# Patient Record
Sex: MALE | Race: WHITE
[De-identification: names, ages, dates, MRNs, and addresses within clinical notes are randomized per-mention and may not be internally consistent; named-entity substitution may affect disease eponyms.]

---

## 2017-01-25 ENCOUNTER — HOSPITAL ENCOUNTER (OUTPATIENT)
Dept: HOSPITAL 19 - ZLAB.STJ | Age: 82
End: 2017-01-25
Attending: FAMILY MEDICINE

## 2017-01-25 DIAGNOSIS — Z01.89: Primary | ICD-10-CM

## 2017-01-25 LAB
ANION GAP SERPL CALC-SCNC: 9 MMOL/L (ref 7–16)
BUN SERPL-MCNC: 19 MG/DL (ref 9–20)
CALCIUM SERPL-MCNC: 9.7 MG/DL (ref 8.4–10.2)
CHLORIDE SERPL-SCNC: 105 MMOL/L (ref 98–107)
CO2 SERPL-SCNC: 28 MMOL/L (ref 22–30)
CREAT SERPL-SCNC: 1.36 MG/DL (ref 0.66–1.25)
GLUCOSE SERPL-MCNC: 83 MG/DL (ref 74–106)
POTASSIUM SERPL-SCNC: 3.9 MMOL/L (ref 3.4–5)
SODIUM SERPL-SCNC: 142 MMOL/L (ref 137–145)

## 2017-02-15 ENCOUNTER — HOSPITAL ENCOUNTER (INPATIENT)
Dept: HOSPITAL 19 - COL.ER | Age: 82
LOS: 3 days | Discharge: NURSING, REHAB OR LONG-TERM CARE | DRG: 280 | End: 2017-02-18
Attending: FAMILY MEDICINE | Admitting: FAMILY MEDICINE
Payer: MEDICARE

## 2017-02-15 VITALS — OXYGEN SATURATION: 97 %

## 2017-02-15 VITALS — OXYGEN SATURATION: 96 %

## 2017-02-15 VITALS — OXYGEN SATURATION: 95 %

## 2017-02-15 VITALS — OXYGEN SATURATION: 94 %

## 2017-02-15 VITALS — SYSTOLIC BLOOD PRESSURE: 166 MMHG | HEART RATE: 59 BPM | DIASTOLIC BLOOD PRESSURE: 87 MMHG

## 2017-02-15 VITALS — OXYGEN SATURATION: 98 %

## 2017-02-15 VITALS — OXYGEN SATURATION: 93 %

## 2017-02-15 VITALS — DIASTOLIC BLOOD PRESSURE: 90 MMHG | TEMPERATURE: 96.8 F | SYSTOLIC BLOOD PRESSURE: 154 MMHG | HEART RATE: 60 BPM

## 2017-02-15 VITALS — TEMPERATURE: 98 F | HEART RATE: 60 BPM | DIASTOLIC BLOOD PRESSURE: 93 MMHG | SYSTOLIC BLOOD PRESSURE: 177 MMHG

## 2017-02-15 VITALS — SYSTOLIC BLOOD PRESSURE: 177 MMHG | DIASTOLIC BLOOD PRESSURE: 93 MMHG | TEMPERATURE: 98.4 F | HEART RATE: 54 BPM

## 2017-02-15 VITALS — OXYGEN SATURATION: 99 %

## 2017-02-15 VITALS — WEIGHT: 193.79 LBS | BODY MASS INDEX: 29.37 KG/M2 | HEIGHT: 67.99 IN

## 2017-02-15 DIAGNOSIS — I21.4: ICD-10-CM

## 2017-02-15 DIAGNOSIS — Z95.0: ICD-10-CM

## 2017-02-15 DIAGNOSIS — Z79.01: ICD-10-CM

## 2017-02-15 DIAGNOSIS — N18.3: ICD-10-CM

## 2017-02-15 DIAGNOSIS — H40.9: ICD-10-CM

## 2017-02-15 DIAGNOSIS — I50.23: ICD-10-CM

## 2017-02-15 DIAGNOSIS — F03.90: ICD-10-CM

## 2017-02-15 DIAGNOSIS — I48.0: ICD-10-CM

## 2017-02-15 DIAGNOSIS — I13.0: Primary | ICD-10-CM

## 2017-02-15 DIAGNOSIS — I69.991: ICD-10-CM

## 2017-02-15 DIAGNOSIS — Z66: ICD-10-CM

## 2017-02-15 DIAGNOSIS — R13.10: ICD-10-CM

## 2017-02-15 DIAGNOSIS — E78.5: ICD-10-CM

## 2017-02-15 LAB
ADJUSTED CALCIUM: 9.5 MG/DL (ref 8.4–10.2)
ALBUMIN SERPL-MCNC: 4 GM/DL (ref 3.5–5)
ALP SERPL-CCNC: 94 U/L (ref 50–136)
ALT SERPL-CCNC: 35 U/L (ref 21–72)
ANION GAP SERPL CALC-SCNC: 12 MMOL/L (ref 7–16)
APTT PPP: 37.4 SECONDS (ref 26–37)
BASOPHILS # BLD: 0 10*3/UL (ref 0–0.2)
BASOPHILS NFR BLD AUTO: 0.7 % (ref 0–2)
BILIRUB SERPL-MCNC: 1 MG/DL (ref 0–1)
BUN SERPL-MCNC: 23 MG/DL (ref 9–20)
CALCIUM SERPL-MCNC: 9.5 MG/DL (ref 8.4–10.2)
CHLORIDE SERPL-SCNC: 103 MMOL/L (ref 98–107)
CO2 SERPL-SCNC: 28 MMOL/L (ref 22–30)
CREAT SERPL-SCNC: 1.24 MG/DL (ref 0.66–1.25)
EOSINOPHIL # BLD: 0.1 10*3/UL (ref 0–0.7)
EOSINOPHIL NFR BLD: 2.2 % (ref 0–4)
ERYTHROCYTE [DISTWIDTH] IN BLOOD BY AUTOMATED COUNT: 13.7 % (ref 11.5–14.5)
FLUBV AG SPEC QL IA: NEGATIVE
GLUCOSE SERPL-MCNC: 126 MG/DL (ref 74–106)
GRANULOCYTES # BLD AUTO: 70.9 % (ref 42.2–75.2)
HCT VFR BLD AUTO: 37.2 % (ref 42–52)
HGB BLD-MCNC: 12.2 G/DL (ref 13.5–18)
INR BLD: 2.3 (ref 0.8–3)
LYMPHOCYTES # BLD: 1.1 10*3/UL (ref 1.2–3.4)
LYMPHOCYTES NFR BLD: 20.1 % (ref 20–51)
MCH RBC QN AUTO: 31 PG (ref 27–31)
MCHC RBC AUTO-ENTMCNC: 33 G/DL (ref 33–37)
MCV RBC AUTO: 95 FL (ref 80–100)
MONOCYTES # BLD: 0.3 10*3/UL (ref 0.1–0.6)
MONOCYTES NFR BLD AUTO: 5.7 % (ref 1.7–9.3)
NEUTROPHILS # BLD: 3.9 10*3/UL (ref 1.4–6.5)
PLATELET # BLD AUTO: 169 K/MM3 (ref 130–400)
PMV BLD AUTO: 9.9 FL (ref 7.4–10.4)
POTASSIUM SERPL-SCNC: 3.5 MMOL/L (ref 3.4–5)
PROT SERPL-MCNC: 7.6 GM/DL (ref 6.4–8.2)
PROTHROMBIN TIME: 26.3 SECONDS (ref 9.7–12.8)
RBC # BLD AUTO: 3.9 M/MM3 (ref 4.2–5.6)
SODIUM SERPL-SCNC: 143 MMOL/L (ref 137–145)
TROPONIN I SERPL-MCNC: 0.08 NG/ML (ref 0–0.03)
WBC # BLD AUTO: 5.5 K/MM3 (ref 4.8–10.8)

## 2017-02-15 PROCEDURE — A9502 TC99M TETROFOSMIN: HCPCS

## 2017-02-16 VITALS — OXYGEN SATURATION: 96 %

## 2017-02-16 VITALS — OXYGEN SATURATION: 94 %

## 2017-02-16 VITALS — OXYGEN SATURATION: 97 %

## 2017-02-16 VITALS — OXYGEN SATURATION: 92 %

## 2017-02-16 VITALS — OXYGEN SATURATION: 98 %

## 2017-02-16 VITALS — OXYGEN SATURATION: 95 %

## 2017-02-16 VITALS
OXYGEN SATURATION: 97 % | TEMPERATURE: 97.6 F | SYSTOLIC BLOOD PRESSURE: 158 MMHG | HEART RATE: 60 BPM | DIASTOLIC BLOOD PRESSURE: 90 MMHG

## 2017-02-16 VITALS — OXYGEN SATURATION: 91 %

## 2017-02-16 VITALS — OXYGEN SATURATION: 93 %

## 2017-02-16 VITALS — DIASTOLIC BLOOD PRESSURE: 96 MMHG | SYSTOLIC BLOOD PRESSURE: 172 MMHG | HEART RATE: 62 BPM

## 2017-02-16 VITALS — OXYGEN SATURATION: 87 %

## 2017-02-16 VITALS
TEMPERATURE: 97.8 F | HEART RATE: 60 BPM | OXYGEN SATURATION: 98 % | SYSTOLIC BLOOD PRESSURE: 137 MMHG | DIASTOLIC BLOOD PRESSURE: 80 MMHG

## 2017-02-16 VITALS — OXYGEN SATURATION: 90 %

## 2017-02-16 VITALS
DIASTOLIC BLOOD PRESSURE: 83 MMHG | SYSTOLIC BLOOD PRESSURE: 140 MMHG | HEART RATE: 60 BPM | OXYGEN SATURATION: 95 % | TEMPERATURE: 97.8 F

## 2017-02-16 VITALS — OXYGEN SATURATION: 80 %

## 2017-02-16 VITALS — OXYGEN SATURATION: 99 %

## 2017-02-16 VITALS — OXYGEN SATURATION: 89 %

## 2017-02-16 VITALS — SYSTOLIC BLOOD PRESSURE: 148 MMHG | DIASTOLIC BLOOD PRESSURE: 73 MMHG | HEART RATE: 66 BPM

## 2017-02-16 VITALS — DIASTOLIC BLOOD PRESSURE: 84 MMHG | SYSTOLIC BLOOD PRESSURE: 174 MMHG | HEART RATE: 60 BPM

## 2017-02-16 VITALS
SYSTOLIC BLOOD PRESSURE: 153 MMHG | HEART RATE: 60 BPM | TEMPERATURE: 97.4 F | OXYGEN SATURATION: 95 % | DIASTOLIC BLOOD PRESSURE: 80 MMHG

## 2017-02-16 VITALS — TEMPERATURE: 97.5 F | SYSTOLIC BLOOD PRESSURE: 124 MMHG | DIASTOLIC BLOOD PRESSURE: 69 MMHG | HEART RATE: 60 BPM

## 2017-02-16 VITALS
TEMPERATURE: 97.8 F | OXYGEN SATURATION: 97 % | SYSTOLIC BLOOD PRESSURE: 177 MMHG | HEART RATE: 62 BPM | DIASTOLIC BLOOD PRESSURE: 87 MMHG

## 2017-02-16 VITALS — OXYGEN SATURATION: 85 %

## 2017-02-16 VITALS — OXYGEN SATURATION: 100 %

## 2017-02-16 VITALS — OXYGEN SATURATION: 88 %

## 2017-02-16 VITALS — SYSTOLIC BLOOD PRESSURE: 158 MMHG | HEART RATE: 60 BPM | OXYGEN SATURATION: 97 % | DIASTOLIC BLOOD PRESSURE: 90 MMHG

## 2017-02-16 VITALS — OXYGEN SATURATION: 86 %

## 2017-02-16 VITALS — HEART RATE: 60 BPM | DIASTOLIC BLOOD PRESSURE: 94 MMHG | SYSTOLIC BLOOD PRESSURE: 174 MMHG

## 2017-02-16 LAB
ANION GAP SERPL CALC-SCNC: 10 MMOL/L (ref 7–16)
BASOPHILS # BLD: 0 10*3/UL (ref 0–0.2)
BASOPHILS NFR BLD AUTO: 0.5 % (ref 0–2)
BUN SERPL-MCNC: 22 MG/DL (ref 9–20)
CALCIUM SERPL-MCNC: 9.3 MG/DL (ref 8.4–10.2)
CHLORIDE SERPL-SCNC: 104 MMOL/L (ref 98–107)
CHOLEST SPEC-SCNC: 88 MG/DL (ref 120–200)
CO2 SERPL-SCNC: 29 MMOL/L (ref 22–30)
CREAT SERPL-SCNC: 1.11 MG/DL (ref 0.66–1.25)
EOSINOPHIL # BLD: 0.1 10*3/UL (ref 0–0.7)
EOSINOPHIL NFR BLD: 2.2 % (ref 0–4)
ERYTHROCYTE [DISTWIDTH] IN BLOOD BY AUTOMATED COUNT: 13.8 % (ref 11.5–14.5)
GLUCOSE SERPL-MCNC: 94 MG/DL (ref 74–106)
GRANULOCYTES # BLD AUTO: 68 % (ref 42.2–75.2)
HCT VFR BLD AUTO: 33 % (ref 42–52)
HDLC SERPL-MCNC: 34 MG/DL
HGB BLD-MCNC: 10.8 G/DL (ref 13.5–18)
LDLC SERPL-MCNC: 40 MG/DL
LYMPHOCYTES # BLD: 1.3 10*3/UL (ref 1.2–3.4)
LYMPHOCYTES NFR BLD: 21.2 % (ref 20–51)
MCH RBC QN AUTO: 31 PG (ref 27–31)
MCHC RBC AUTO-ENTMCNC: 33 G/DL (ref 33–37)
MCV RBC AUTO: 95 FL (ref 80–100)
MONOCYTES # BLD: 0.5 10*3/UL (ref 0.1–0.6)
MONOCYTES NFR BLD AUTO: 7.9 % (ref 1.7–9.3)
NEUTROPHILS # BLD: 4.3 10*3/UL (ref 1.4–6.5)
PLATELET # BLD AUTO: 175 K/MM3 (ref 130–400)
PMV BLD AUTO: 10.1 FL (ref 7.4–10.4)
POTASSIUM SERPL-SCNC: 3.6 MMOL/L (ref 3.4–5)
RBC # BLD AUTO: 3.47 M/MM3 (ref 4.2–5.6)
SODIUM SERPL-SCNC: 142 MMOL/L (ref 137–145)
TRIGL SERPL-MCNC: 72 MG/DL
TROPONIN I SERPL-MCNC: 7.54 NG/ML (ref 0–0.03)
WBC # BLD AUTO: 6.3 K/MM3 (ref 4.8–10.8)

## 2017-02-17 VITALS — OXYGEN SATURATION: 96 %

## 2017-02-17 VITALS — OXYGEN SATURATION: 94 %

## 2017-02-17 VITALS — OXYGEN SATURATION: 97 %

## 2017-02-17 VITALS — OXYGEN SATURATION: 95 %

## 2017-02-17 VITALS — OXYGEN SATURATION: 99 %

## 2017-02-17 VITALS — OXYGEN SATURATION: 93 %

## 2017-02-17 VITALS — OXYGEN SATURATION: 98 %

## 2017-02-17 VITALS
TEMPERATURE: 98 F | DIASTOLIC BLOOD PRESSURE: 70 MMHG | OXYGEN SATURATION: 97 % | HEART RATE: 60 BPM | SYSTOLIC BLOOD PRESSURE: 123 MMHG

## 2017-02-17 VITALS — HEART RATE: 60 BPM | DIASTOLIC BLOOD PRESSURE: 75 MMHG | TEMPERATURE: 97.8 F | SYSTOLIC BLOOD PRESSURE: 135 MMHG

## 2017-02-17 VITALS — SYSTOLIC BLOOD PRESSURE: 118 MMHG | HEART RATE: 60 BPM | TEMPERATURE: 97.5 F | DIASTOLIC BLOOD PRESSURE: 59 MMHG

## 2017-02-17 VITALS — SYSTOLIC BLOOD PRESSURE: 103 MMHG | HEART RATE: 70 BPM | DIASTOLIC BLOOD PRESSURE: 54 MMHG | TEMPERATURE: 97.1 F

## 2017-02-17 VITALS
SYSTOLIC BLOOD PRESSURE: 133 MMHG | TEMPERATURE: 97.5 F | DIASTOLIC BLOOD PRESSURE: 70 MMHG | OXYGEN SATURATION: 97 % | HEART RATE: 67 BPM

## 2017-02-17 VITALS
HEART RATE: 61 BPM | SYSTOLIC BLOOD PRESSURE: 115 MMHG | DIASTOLIC BLOOD PRESSURE: 72 MMHG | TEMPERATURE: 98 F | OXYGEN SATURATION: 94 %

## 2017-02-17 VITALS — OXYGEN SATURATION: 92 %

## 2017-02-17 VITALS — HEART RATE: 59 BPM | SYSTOLIC BLOOD PRESSURE: 125 MMHG | TEMPERATURE: 97.8 F | DIASTOLIC BLOOD PRESSURE: 71 MMHG

## 2017-02-17 VITALS — OXYGEN SATURATION: 89 %

## 2017-02-17 LAB
ANION GAP SERPL CALC-SCNC: 11 MMOL/L (ref 7–16)
BUN SERPL-MCNC: 23 MG/DL (ref 9–20)
CALCIUM SERPL-MCNC: 9.2 MG/DL (ref 8.4–10.2)
CHLORIDE SERPL-SCNC: 102 MMOL/L (ref 98–107)
CO2 SERPL-SCNC: 27 MMOL/L (ref 22–30)
CREAT SERPL-SCNC: 1.09 MG/DL (ref 0.66–1.25)
GLUCOSE SERPL-MCNC: 103 MG/DL (ref 74–106)
INR BLD: 2.8 (ref 0.8–3)
POTASSIUM SERPL-SCNC: 3.3 MMOL/L (ref 3.4–5)
PROTHROMBIN TIME: 31.8 SECONDS (ref 9.7–12.8)
SODIUM SERPL-SCNC: 141 MMOL/L (ref 137–145)
TROPONIN I SERPL-MCNC: 2.6 NG/ML (ref 0–0.03)

## 2017-02-18 VITALS — HEART RATE: 59 BPM | TEMPERATURE: 97.9 F | DIASTOLIC BLOOD PRESSURE: 29 MMHG | SYSTOLIC BLOOD PRESSURE: 88 MMHG

## 2017-02-18 VITALS — DIASTOLIC BLOOD PRESSURE: 69 MMHG | HEART RATE: 68 BPM | TEMPERATURE: 99.3 F | SYSTOLIC BLOOD PRESSURE: 141 MMHG

## 2017-02-18 VITALS — SYSTOLIC BLOOD PRESSURE: 113 MMHG | HEART RATE: 62 BPM | DIASTOLIC BLOOD PRESSURE: 62 MMHG

## 2017-02-18 VITALS — DIASTOLIC BLOOD PRESSURE: 60 MMHG | TEMPERATURE: 97.4 F | SYSTOLIC BLOOD PRESSURE: 129 MMHG | HEART RATE: 61 BPM

## 2017-02-18 VITALS — HEART RATE: 61 BPM | TEMPERATURE: 97.4 F | SYSTOLIC BLOOD PRESSURE: 129 MMHG | DIASTOLIC BLOOD PRESSURE: 60 MMHG

## 2017-02-18 LAB
ANION GAP SERPL CALC-SCNC: 9 MMOL/L (ref 7–16)
BASOPHILS # BLD: 0 10*3/UL (ref 0–0.2)
BASOPHILS NFR BLD AUTO: 0.6 % (ref 0–2)
BUN SERPL-MCNC: 23 MG/DL (ref 9–20)
CALCIUM SERPL-MCNC: 9.5 MG/DL (ref 8.4–10.2)
CHLORIDE SERPL-SCNC: 102 MMOL/L (ref 98–107)
CO2 SERPL-SCNC: 30 MMOL/L (ref 22–30)
CREAT SERPL-SCNC: 1.15 MG/DL (ref 0.66–1.25)
EOSINOPHIL # BLD: 0.2 10*3/UL (ref 0–0.7)
EOSINOPHIL NFR BLD: 3 % (ref 0–4)
ERYTHROCYTE [DISTWIDTH] IN BLOOD BY AUTOMATED COUNT: 13.8 % (ref 11.5–14.5)
GLUCOSE SERPL-MCNC: 90 MG/DL (ref 74–106)
GRANULOCYTES # BLD AUTO: 61.7 % (ref 42.2–75.2)
HCT VFR BLD AUTO: 32.8 % (ref 42–52)
HGB BLD-MCNC: 10.7 G/DL (ref 13.5–18)
INR BLD: 2.2 (ref 0.8–3)
LYMPHOCYTES # BLD: 1.3 10*3/UL (ref 1.2–3.4)
LYMPHOCYTES NFR BLD: 25.5 % (ref 20–51)
MCH RBC QN AUTO: 31 PG (ref 27–31)
MCHC RBC AUTO-ENTMCNC: 33 G/DL (ref 33–37)
MCV RBC AUTO: 96 FL (ref 80–100)
MONOCYTES # BLD: 0.5 10*3/UL (ref 0.1–0.6)
MONOCYTES NFR BLD AUTO: 9 % (ref 1.7–9.3)
NEUTROPHILS # BLD: 3.2 10*3/UL (ref 1.4–6.5)
PLATELET # BLD AUTO: 156 K/MM3 (ref 130–400)
PMV BLD AUTO: 10.4 FL (ref 7.4–10.4)
POTASSIUM SERPL-SCNC: 3.5 MMOL/L (ref 3.4–5)
PROTHROMBIN TIME: 24.6 SECONDS (ref 9.7–12.8)
RBC # BLD AUTO: 3.41 M/MM3 (ref 4.2–5.6)
SODIUM SERPL-SCNC: 141 MMOL/L (ref 137–145)
WBC # BLD AUTO: 5.3 K/MM3 (ref 4.8–10.8)

## 2019-02-17 ENCOUNTER — HOSPITAL ENCOUNTER (INPATIENT)
Dept: HOSPITAL 19 - COL.ER | Age: 84
LOS: 11 days | Discharge: HOSPICE | DRG: 870 | End: 2019-02-28
Attending: HOSPITALIST | Admitting: HOSPITALIST
Payer: MEDICARE

## 2019-02-17 VITALS — OXYGEN SATURATION: 100 % | DIASTOLIC BLOOD PRESSURE: 60 MMHG | SYSTOLIC BLOOD PRESSURE: 100 MMHG | HEART RATE: 60 BPM

## 2019-02-17 VITALS — OXYGEN SATURATION: 99 %

## 2019-02-17 VITALS — OXYGEN SATURATION: 100 %

## 2019-02-17 VITALS — OXYGEN SATURATION: 98 %

## 2019-02-17 VITALS — TEMPERATURE: 99.5 F | SYSTOLIC BLOOD PRESSURE: 101 MMHG | HEART RATE: 65 BPM | DIASTOLIC BLOOD PRESSURE: 60 MMHG

## 2019-02-17 VITALS — DIASTOLIC BLOOD PRESSURE: 57 MMHG | OXYGEN SATURATION: 96 % | HEART RATE: 60 BPM | SYSTOLIC BLOOD PRESSURE: 108 MMHG

## 2019-02-17 VITALS — BODY MASS INDEX: 30.84 KG/M2 | HEIGHT: 67.99 IN | WEIGHT: 203.49 LBS

## 2019-02-17 VITALS — OXYGEN SATURATION: 95 %

## 2019-02-17 VITALS — OXYGEN SATURATION: 97 %

## 2019-02-17 DIAGNOSIS — E87.2: ICD-10-CM

## 2019-02-17 DIAGNOSIS — E05.90: ICD-10-CM

## 2019-02-17 DIAGNOSIS — I21.A1: ICD-10-CM

## 2019-02-17 DIAGNOSIS — G72.81: ICD-10-CM

## 2019-02-17 DIAGNOSIS — J15.0: ICD-10-CM

## 2019-02-17 DIAGNOSIS — I50.32: ICD-10-CM

## 2019-02-17 DIAGNOSIS — J90: ICD-10-CM

## 2019-02-17 DIAGNOSIS — Z66: ICD-10-CM

## 2019-02-17 DIAGNOSIS — R65.20: ICD-10-CM

## 2019-02-17 DIAGNOSIS — C34.31: ICD-10-CM

## 2019-02-17 DIAGNOSIS — E78.5: ICD-10-CM

## 2019-02-17 DIAGNOSIS — Z51.5: ICD-10-CM

## 2019-02-17 DIAGNOSIS — N17.9: ICD-10-CM

## 2019-02-17 DIAGNOSIS — Z79.01: ICD-10-CM

## 2019-02-17 DIAGNOSIS — E86.0: ICD-10-CM

## 2019-02-17 DIAGNOSIS — Z95.0: ICD-10-CM

## 2019-02-17 DIAGNOSIS — I13.0: ICD-10-CM

## 2019-02-17 DIAGNOSIS — A41.1: Primary | ICD-10-CM

## 2019-02-17 DIAGNOSIS — J96.01: ICD-10-CM

## 2019-02-17 DIAGNOSIS — I48.2: ICD-10-CM

## 2019-02-17 DIAGNOSIS — E87.0: ICD-10-CM

## 2019-02-17 DIAGNOSIS — N18.9: ICD-10-CM

## 2019-02-17 LAB
ALBUMIN SERPL-MCNC: 4 GM/DL (ref 3.5–5)
ALP SERPL-CCNC: 123 U/L (ref 50–136)
ALT SERPL-CCNC: 24 U/L (ref 21–72)
ANION GAP SERPL CALC-SCNC: 17 MMOL/L (ref 7–16)
APTT PPP: 33.3 SECONDS (ref 26–37)
AST SERPL-CCNC: 32 U/L (ref 15–37)
BASE EXCESS BLDA CALC-SCNC: -3.5 MMOL/L (ref -2–2)
BASE EXCESS BLDA CALC-SCNC: -5.4 MMOL/L (ref -2–2)
BASE EXCESS BLDA CALC-SCNC: -5.6 MMOL/L (ref -2–2)
BILIRUB SERPL-MCNC: 1.4 MG/DL (ref 0–1)
BUN SERPL-MCNC: 29 MG/DL (ref 9–20)
CALCIUM SERPL-MCNC: 9.6 MG/DL (ref 8.4–10.2)
CHLORIDE SERPL-SCNC: 106 MMOL/L (ref 98–107)
CO2 BLDA-SCNC: 18 MMOL/L
CO2 BLDA-SCNC: 19.5 MMOL/L
CO2 BLDA-SCNC: 21.2 MMOL/L
CO2 SERPL-SCNC: 17 MMOL/L (ref 22–30)
CREAT SERPL-SCNC: 1.91 MG/DL (ref 0.66–1.25)
EOSINOPHIL NFR BLD: 1 % (ref 0–4)
ERYTHROCYTE [DISTWIDTH] IN BLOOD BY AUTOMATED COUNT: 13.8 % (ref 11.5–14.5)
GLUCOSE SERPL-MCNC: 163 MG/DL (ref 74–106)
HCO3 BLDA-SCNC: 17.2 MEQ/L (ref 22–26)
HCO3 BLDA-SCNC: 18.6 MEQ/L (ref 22–26)
HCO3 BLDA-SCNC: 20.3 MEQ/L (ref 22–26)
HCT VFR BLD AUTO: 32.5 % (ref 42–52)
HGB BLD-MCNC: 10.6 G/DL (ref 13.5–18)
INHALED O2 CONCENTRATION: 100 %
INHALED O2 CONCENTRATION: 50 %
INHALED O2 CONCENTRATION: 99 %
INR BLD: 2.9 (ref 0.8–3)
LYMPHOCYTES NFR BLD MANUAL: 5 % (ref 20–51)
MCH RBC QN AUTO: 31 PG (ref 27–31)
MCHC RBC AUTO-ENTMCNC: 33 G/DL (ref 33–37)
MCV RBC AUTO: 95 FL (ref 80–100)
NEUTS BAND NFR BLD: 14 % (ref 0–10)
NEUTS SEG NFR BLD MANUAL: 80 % (ref 42–75.2)
PCO2 BLDA: 25.6 MMHG (ref 35–45)
PCO2 BLDA: 31.6 MMHG (ref 35–45)
PCO2 BLDA: 32.1 MMHG (ref 35–45)
PLATELET # BLD AUTO: 182 K/MM3 (ref 130–400)
PMV BLD AUTO: 9.9 FL (ref 7.4–10.4)
PO2 BLDA: 238.9 MMHG (ref 80–100)
PO2 BLDA: 53.2 MMHG (ref 80–100)
PO2 BLDA: 71.5 MMHG (ref 80–100)
POTASSIUM SERPL-SCNC: 4.5 MMOL/L (ref 3.4–5)
PROT SERPL-MCNC: 7.7 GM/DL (ref 6.4–8.2)
PROTHROMBIN TIME: 33.5 SECONDS (ref 9.7–12.8)
RBC # BLD AUTO: 3.41 M/MM3 (ref 4.2–5.6)
SAO2 % BLDA: 86.9 % (ref 92–100)
SAO2 % BLDA: 92.5 % (ref 92–100)
SAO2 % BLDA: 98.5 % (ref 92–100)
SODIUM SERPL-SCNC: 141 MMOL/L (ref 137–145)
TROPONIN I SERPL-MCNC: 0.04 NG/ML (ref 0–0.04)

## 2019-02-17 PROCEDURE — C1751 CATH, INF, PER/CENT/MIDLINE: HCPCS

## 2019-02-17 PROCEDURE — 5A1955Z RESPIRATORY VENTILATION, GREATER THAN 96 CONSECUTIVE HOURS: ICD-10-PCS | Performed by: INTERNAL MEDICINE

## 2019-02-17 PROCEDURE — A4216 STERILE WATER/SALINE, 10 ML: HCPCS

## 2019-02-17 NOTE — NUR
Anesthesia provider intubated pt at 2011 in ICU01 with an 8.0 tube. Sedation
medication provided per provider. Provider remained at bedside for
approximately 20 minutes following intubation. RT staff X2 with nursing staff
X3 in room at this time. Pt tolerated procedure.

## 2019-02-17 NOTE — NUR
PT ARRIVED FROM ED. PATIENT INTUBATED BY CRNA WITH A 8.0 ET TUBE. COLOR CHANGE
ON ETCO2, BILATERAL CHEST RISE AND BS. CXR TAKEN TO CONFIRM ET TUBE PLACEMENT,
PT PLACED ON VENT PER DOCTORS ORDERS. ABG DRAWN 30 MINUTES AFTER BEING PLACED
ON VENT, RESULTS CALLED TO [facility]. FIO2 TITRATED AS PER [facility] ORDERS, NO
FURTHER ORDERS FROM [facility] AT THIS TIME.

## 2019-02-17 NOTE — NUR
Report received from Aarti FRANCISCO from ED. ED nurse notified unit is awaiting
another staff member in order to assume care. Will call when staff member
arrives.

## 2019-02-18 VITALS — TEMPERATURE: 97.5 F | HEART RATE: 67 BPM | SYSTOLIC BLOOD PRESSURE: 104 MMHG | DIASTOLIC BLOOD PRESSURE: 64 MMHG

## 2019-02-18 VITALS — OXYGEN SATURATION: 96 %

## 2019-02-18 VITALS — OXYGEN SATURATION: 95 %

## 2019-02-18 VITALS — TEMPERATURE: 97.9 F | HEART RATE: 59 BPM | SYSTOLIC BLOOD PRESSURE: 102 MMHG | DIASTOLIC BLOOD PRESSURE: 58 MMHG

## 2019-02-18 VITALS — OXYGEN SATURATION: 98 %

## 2019-02-18 VITALS — OXYGEN SATURATION: 99 %

## 2019-02-18 VITALS — OXYGEN SATURATION: 97 %

## 2019-02-18 VITALS — OXYGEN SATURATION: 93 %

## 2019-02-18 VITALS — OXYGEN SATURATION: 94 %

## 2019-02-18 VITALS — OXYGEN SATURATION: 92 %

## 2019-02-18 VITALS — TEMPERATURE: 97.8 F | HEART RATE: 61 BPM | DIASTOLIC BLOOD PRESSURE: 62 MMHG | SYSTOLIC BLOOD PRESSURE: 111 MMHG

## 2019-02-18 VITALS
DIASTOLIC BLOOD PRESSURE: 60 MMHG | OXYGEN SATURATION: 97 % | HEART RATE: 65 BPM | TEMPERATURE: 99.5 F | SYSTOLIC BLOOD PRESSURE: 101 MMHG

## 2019-02-18 VITALS — OXYGEN SATURATION: 91 %

## 2019-02-18 VITALS
DIASTOLIC BLOOD PRESSURE: 67 MMHG | SYSTOLIC BLOOD PRESSURE: 120 MMHG | OXYGEN SATURATION: 97 % | TEMPERATURE: 97.7 F | HEART RATE: 61 BPM

## 2019-02-18 VITALS — HEART RATE: 60 BPM | SYSTOLIC BLOOD PRESSURE: 96 MMHG | TEMPERATURE: 97.7 F | DIASTOLIC BLOOD PRESSURE: 45 MMHG

## 2019-02-18 VITALS — TEMPERATURE: 98 F | HEART RATE: 64 BPM | DIASTOLIC BLOOD PRESSURE: 56 MMHG | SYSTOLIC BLOOD PRESSURE: 100 MMHG

## 2019-02-18 VITALS — OXYGEN SATURATION: 100 %

## 2019-02-18 VITALS — OXYGEN SATURATION: 90 %

## 2019-02-18 VITALS — OXYGEN SATURATION: 88 %

## 2019-02-18 VITALS — OXYGEN SATURATION: 85 %

## 2019-02-18 LAB
ALBUMIN SERPL-MCNC: 3 GM/DL (ref 3.5–5)
ALP SERPL-CCNC: 77 U/L (ref 50–136)
ALT SERPL-CCNC: 29 U/L (ref 21–72)
ANION GAP SERPL CALC-SCNC: 8 MMOL/L (ref 7–16)
AST SERPL-CCNC: 47 U/L (ref 15–37)
BASE EXCESS BLDA CALC-SCNC: -2.6 MMOL/L (ref -2–2)
BASOPHILS # BLD: 0 10*3/UL (ref 0–0.2)
BASOPHILS NFR BLD AUTO: 0.4 % (ref 0–2)
BILIRUB SERPL-MCNC: 0.7 MG/DL (ref 0–1)
BUN SERPL-MCNC: 32 MG/DL (ref 9–20)
CALCIUM SERPL-MCNC: 8.3 MG/DL (ref 8.4–10.2)
CHLORIDE SERPL-SCNC: 111 MMOL/L (ref 98–107)
CO2 BLDA-SCNC: 22.9 MMOL/L
CO2 SERPL-SCNC: 23 MMOL/L (ref 22–30)
CREAT SERPL-SCNC: 1.9 MG/DL (ref 0.66–1.25)
EOSINOPHIL # BLD: 0 10*3/UL (ref 0–0.7)
EOSINOPHIL NFR BLD: 0.1 % (ref 0–4)
ERYTHROCYTE [DISTWIDTH] IN BLOOD BY AUTOMATED COUNT: 14.3 % (ref 11.5–14.5)
GLUCOSE SERPL-MCNC: 111 MG/DL (ref 74–106)
GRANULOCYTES # BLD AUTO: 87.4 % (ref 42.2–75.2)
HCO3 BLDA-SCNC: 21.8 MEQ/L (ref 22–26)
HCT VFR BLD AUTO: 27.3 % (ref 42–52)
HGB BLD-MCNC: 8.9 G/DL (ref 13.5–18)
INHALED O2 CONCENTRATION: 40 %
INR BLD: 3.9 (ref 0.8–3)
LYMPHOCYTES # BLD: 0.6 10*3/UL (ref 1.2–3.4)
LYMPHOCYTES NFR BLD: 6.5 % (ref 20–51)
MAGNESIUM SERPL-MCNC: 2 MG/DL (ref 1.6–2.3)
MCH RBC QN AUTO: 32 PG (ref 27–31)
MCHC RBC AUTO-ENTMCNC: 33 G/DL (ref 33–37)
MCV RBC AUTO: 98 FL (ref 80–100)
MONOCYTES # BLD: 0.5 10*3/UL (ref 0.1–0.6)
MONOCYTES NFR BLD AUTO: 5.3 % (ref 1.7–9.3)
NEUTROPHILS # BLD: 8.2 10*3/UL (ref 1.4–6.5)
PCO2 BLDA: 36.3 MMHG (ref 35–45)
PHOSPHATE SERPL-MCNC: 4.5 MG/DL (ref 2.5–4.5)
PLATELET # BLD AUTO: 126 K/MM3 (ref 130–400)
PMV BLD AUTO: 9.8 FL (ref 7.4–10.4)
PO2 BLDA: 97.3 MMHG (ref 80–100)
POTASSIUM SERPL-SCNC: 4.5 MMOL/L (ref 3.4–5)
PROT SERPL-MCNC: 6.1 GM/DL (ref 6.4–8.2)
PROTHROMBIN TIME: 44.7 SECONDS (ref 9.7–12.8)
RBC # BLD AUTO: 2.8 M/MM3 (ref 4.2–5.6)
SAO2 % BLDA: 96.3 % (ref 92–100)
SODIUM SERPL-SCNC: 141 MMOL/L (ref 137–145)
TROPONIN I SERPL-MCNC: 1.15 NG/ML (ref 0–0.04)

## 2019-02-18 NOTE — NUR
Bedside report recieved from Dora FRANCISCO. Medication gtt's verified; ET and OG
tubes verified as well. Patient turned, repositioned, and skin assessed. Cath
care done at this time. Call light in reach. Family in room after report
complete.

## 2019-02-18 NOTE — NUR
Patient drowsy, minimal response when name called or with tactile stim.
Sedation tritrated down again at this time.

## 2019-02-18 NOTE — NUR
Received a phone call from Dr. Huntley regarding PICC tip location. PICC
flushed with 30ml normal saline with good blood return noted. Chest x ray
repeated. PICC tip location in lower SVC.

## 2019-02-18 NOTE — NUR
Patient restless, pulling against wrist restraints. Sedation increased at this
time as patiet doesn't follow commands when told to relax. Will attempt to
wean sedation down after PICC placement.

## 2019-02-18 NOTE — NUR
Bedside report given to Lelo FRANCISCO. Family and RT at bedside for report,
questions answered. Patient repositioned to right side and skin inspected with
on coming RN

## 2019-02-18 NOTE — NUR
Family at bedside.  Assessment completed; patient opens eyes spontaneously and
is observed moving all extremities. Not able to follow commands at this time.
Granddaughter states he is very hard of hearing.  Will continue to monitor.

## 2019-02-18 NOTE — NUR
SW met with patient, who is intubated, and his daughter.  Patient lives at [facility]
in Independent living.  He was driving up to this weekend and doing well
independently.  Patients PCP is Dr Valera and he obtains his medications from
[facility].  Patients DPOA is in EMR.  Patients Daughter reports if he is able
they would like him to go back to [facility] for IL or SN if needed but he also
recently got diagnosed with cancer and they could possibly be looking into
hospice.  SW discussed their options if they would like to persue hospice but
they would like to continue to see how he progresses.  SW will continue to
follow to assist in dc planning.

## 2019-02-18 NOTE — NUR
Starting to arouse better with verbal and tactile stim. Will squeeze hands of
this RN, but will not open eyes. Sedation titrted down again. Will monitor.
Daughter at bedside, and explained titration, voices understanding.

## 2019-02-18 NOTE — NUR
Sedation vacation started at this time. Propofol and Fentanyl both decreased,
will monitor for need to adjust.

## 2019-02-19 VITALS — OXYGEN SATURATION: 95 %

## 2019-02-19 VITALS — OXYGEN SATURATION: 98 %

## 2019-02-19 VITALS — OXYGEN SATURATION: 97 %

## 2019-02-19 VITALS — OXYGEN SATURATION: 96 %

## 2019-02-19 VITALS — OXYGEN SATURATION: 94 %

## 2019-02-19 VITALS — OXYGEN SATURATION: 93 %

## 2019-02-19 VITALS
HEART RATE: 60 BPM | OXYGEN SATURATION: 96 % | DIASTOLIC BLOOD PRESSURE: 57 MMHG | SYSTOLIC BLOOD PRESSURE: 118 MMHG | TEMPERATURE: 98.2 F

## 2019-02-19 VITALS — OXYGEN SATURATION: 91 %

## 2019-02-19 VITALS — DIASTOLIC BLOOD PRESSURE: 62 MMHG | HEART RATE: 60 BPM | SYSTOLIC BLOOD PRESSURE: 118 MMHG | TEMPERATURE: 98.6 F

## 2019-02-19 VITALS — HEART RATE: 63 BPM | SYSTOLIC BLOOD PRESSURE: 116 MMHG | OXYGEN SATURATION: 95 % | DIASTOLIC BLOOD PRESSURE: 68 MMHG

## 2019-02-19 VITALS — OXYGEN SATURATION: 90 %

## 2019-02-19 VITALS — OXYGEN SATURATION: 92 %

## 2019-02-19 VITALS — HEART RATE: 60 BPM | SYSTOLIC BLOOD PRESSURE: 116 MMHG | DIASTOLIC BLOOD PRESSURE: 58 MMHG | TEMPERATURE: 98.7 F

## 2019-02-19 VITALS — OXYGEN SATURATION: 100 %

## 2019-02-19 VITALS
SYSTOLIC BLOOD PRESSURE: 112 MMHG | HEART RATE: 60 BPM | OXYGEN SATURATION: 96 % | TEMPERATURE: 99 F | DIASTOLIC BLOOD PRESSURE: 54 MMHG

## 2019-02-19 VITALS — DIASTOLIC BLOOD PRESSURE: 62 MMHG | HEART RATE: 60 BPM | TEMPERATURE: 97.6 F | SYSTOLIC BLOOD PRESSURE: 136 MMHG

## 2019-02-19 VITALS — HEART RATE: 59 BPM | DIASTOLIC BLOOD PRESSURE: 55 MMHG | TEMPERATURE: 97.7 F | SYSTOLIC BLOOD PRESSURE: 126 MMHG

## 2019-02-19 VITALS — OXYGEN SATURATION: 99 %

## 2019-02-19 LAB
ALBUMIN SERPL-MCNC: 2.8 GM/DL (ref 3.5–5)
ALP SERPL-CCNC: 79 U/L (ref 50–136)
ALT SERPL-CCNC: 35 U/L (ref 21–72)
ANION GAP SERPL CALC-SCNC: 4 MMOL/L (ref 7–16)
AST SERPL-CCNC: 50 U/L (ref 15–37)
BASE EXCESS BLDA CALC-SCNC: -3.1 MMOL/L (ref -2–2)
BASOPHILS # BLD: 0 10*3/UL (ref 0–0.2)
BASOPHILS NFR BLD AUTO: 0.3 % (ref 0–2)
BILIRUB SERPL-MCNC: 0.5 MG/DL (ref 0–1)
BUN SERPL-MCNC: 33 MG/DL (ref 9–20)
CALCIUM SERPL-MCNC: 8.4 MG/DL (ref 8.4–10.2)
CHLORIDE SERPL-SCNC: 114 MMOL/L (ref 98–107)
CO2 BLDA-SCNC: 22.7 MMOL/L
CO2 SERPL-SCNC: 24 MMOL/L (ref 22–30)
CREAT SERPL-SCNC: 1.62 MG/DL (ref 0.66–1.25)
EOSINOPHIL # BLD: 0.2 10*3/UL (ref 0–0.7)
EOSINOPHIL NFR BLD: 3.2 % (ref 0–4)
ERYTHROCYTE [DISTWIDTH] IN BLOOD BY AUTOMATED COUNT: 14.6 % (ref 11.5–14.5)
GLUCOSE SERPL-MCNC: 108 MG/DL (ref 74–106)
GRANULOCYTES # BLD AUTO: 83.3 % (ref 42.2–75.2)
HCO3 BLDA-SCNC: 21.5 MEQ/L (ref 22–26)
HCT VFR BLD AUTO: 27.1 % (ref 42–52)
HGB BLD-MCNC: 8.6 G/DL (ref 13.5–18)
INHALED O2 CONCENTRATION: 40 %
INR BLD: 3.6 (ref 0.8–3)
LYMPHOCYTES # BLD: 0.5 10*3/UL (ref 1.2–3.4)
LYMPHOCYTES NFR BLD: 6.7 % (ref 20–51)
MAGNESIUM SERPL-MCNC: 2.2 MG/DL (ref 1.6–2.3)
MCH RBC QN AUTO: 31 PG (ref 27–31)
MCHC RBC AUTO-ENTMCNC: 32 G/DL (ref 33–37)
MCV RBC AUTO: 99 FL (ref 80–100)
MONOCYTES # BLD: 0.5 10*3/UL (ref 0.1–0.6)
MONOCYTES NFR BLD AUTO: 6 % (ref 1.7–9.3)
NEUTROPHILS # BLD: 6.3 10*3/UL (ref 1.4–6.5)
PCO2 BLDA: 37 MMHG (ref 35–45)
PHOSPHATE SERPL-MCNC: 2.7 MG/DL (ref 2.5–4.5)
PLATELET # BLD AUTO: 129 K/MM3 (ref 130–400)
PMV BLD AUTO: 10 FL (ref 7.4–10.4)
PO2 BLDA: 68.3 MMHG (ref 80–100)
POTASSIUM SERPL-SCNC: 4 MMOL/L (ref 3.4–5)
PROT SERPL-MCNC: 5.8 GM/DL (ref 6.4–8.2)
PROTHROMBIN TIME: 40.8 SECONDS (ref 9.7–12.8)
RBC # BLD AUTO: 2.74 M/MM3 (ref 4.2–5.6)
SAO2 % BLDA: 91.8 % (ref 92–100)
SODIUM SERPL-SCNC: 142 MMOL/L (ref 137–145)

## 2019-02-19 NOTE — NUR
Patient able to squeeze hands on command; tolerating ventilator well on
minimal sedation. No concerns at this time.

## 2019-02-19 NOTE — NUR
Received bedside report from MARYAM Lind. Patient seemed to be resting
peacefully. Medications and ventilator setting varified. Will continue to
monitor patient.

## 2019-02-19 NOTE — NUR
Assessment completed and charted at this time, please see documentation for
details. Patient resting in bed, tolerating ventilator well at this time. No
new issues, will continue to monitor and assess.

## 2019-02-19 NOTE — NUR
SUCTIONED LARGE AMOUNTS OF BLOODY SPUTUM FROM ETTUBE EVERY TIME PT IS
SUCTIONED. THIS APPEARS TO BE NEW AS I DIDN'T RECIEVE THIS IN REPORT. WILL LET
DAY SHIFT KNOW AS WELL AS  WILL CONTINUE TO ASSESS AND MONITOR.

## 2019-02-20 VITALS — OXYGEN SATURATION: 92 %

## 2019-02-20 VITALS — OXYGEN SATURATION: 95 %

## 2019-02-20 VITALS — OXYGEN SATURATION: 97 %

## 2019-02-20 VITALS — OXYGEN SATURATION: 98 %

## 2019-02-20 VITALS — OXYGEN SATURATION: 96 %

## 2019-02-20 VITALS — OXYGEN SATURATION: 94 %

## 2019-02-20 VITALS — OXYGEN SATURATION: 91 %

## 2019-02-20 VITALS — OXYGEN SATURATION: 99 %

## 2019-02-20 VITALS — OXYGEN SATURATION: 93 %

## 2019-02-20 VITALS
DIASTOLIC BLOOD PRESSURE: 68 MMHG | SYSTOLIC BLOOD PRESSURE: 133 MMHG | HEART RATE: 62 BPM | TEMPERATURE: 98.4 F | OXYGEN SATURATION: 98 %

## 2019-02-20 VITALS — OXYGEN SATURATION: 89 %

## 2019-02-20 VITALS
SYSTOLIC BLOOD PRESSURE: 124 MMHG | HEART RATE: 60 BPM | OXYGEN SATURATION: 97 % | DIASTOLIC BLOOD PRESSURE: 63 MMHG | TEMPERATURE: 97.6 F

## 2019-02-20 VITALS
TEMPERATURE: 98.2 F | SYSTOLIC BLOOD PRESSURE: 116 MMHG | DIASTOLIC BLOOD PRESSURE: 59 MMHG | HEART RATE: 60 BPM | OXYGEN SATURATION: 96 %

## 2019-02-20 VITALS
TEMPERATURE: 97.8 F | HEART RATE: 62 BPM | SYSTOLIC BLOOD PRESSURE: 124 MMHG | OXYGEN SATURATION: 94 % | DIASTOLIC BLOOD PRESSURE: 59 MMHG

## 2019-02-20 VITALS — SYSTOLIC BLOOD PRESSURE: 123 MMHG | TEMPERATURE: 97.8 F | HEART RATE: 60 BPM | DIASTOLIC BLOOD PRESSURE: 67 MMHG

## 2019-02-20 VITALS — OXYGEN SATURATION: 87 %

## 2019-02-20 VITALS — OXYGEN SATURATION: 90 %

## 2019-02-20 VITALS — OXYGEN SATURATION: 84 %

## 2019-02-20 VITALS — OXYGEN SATURATION: 85 %

## 2019-02-20 VITALS — OXYGEN SATURATION: 86 %

## 2019-02-20 VITALS — OXYGEN SATURATION: 88 %

## 2019-02-20 VITALS — SYSTOLIC BLOOD PRESSURE: 128 MMHG | HEART RATE: 60 BPM | DIASTOLIC BLOOD PRESSURE: 65 MMHG | TEMPERATURE: 98.4 F

## 2019-02-20 LAB
ALBUMIN SERPL-MCNC: 2.6 GM/DL (ref 3.5–5)
ALP SERPL-CCNC: 85 U/L (ref 50–136)
ALT SERPL-CCNC: 30 U/L (ref 21–72)
ANION GAP SERPL CALC-SCNC: 2 MMOL/L (ref 7–16)
AST SERPL-CCNC: 34 U/L (ref 15–37)
BASE EXCESS BLDA CALC-SCNC: -3.2 MMOL/L (ref -2–2)
BASOPHILS # BLD: 0 10*3/UL (ref 0–0.2)
BASOPHILS NFR BLD AUTO: 0.3 % (ref 0–2)
BILIRUB SERPL-MCNC: 0.7 MG/DL (ref 0–1)
BUN SERPL-MCNC: 33 MG/DL (ref 9–20)
CALCIUM SERPL-MCNC: 8.2 MG/DL (ref 8.4–10.2)
CHLORIDE SERPL-SCNC: 118 MMOL/L (ref 98–107)
CO2 BLDA-SCNC: 22.3 MMOL/L
CO2 SERPL-SCNC: 24 MMOL/L (ref 22–30)
CREAT SERPL-SCNC: 1.41 MG/DL (ref 0.66–1.25)
EOSINOPHIL # BLD: 0.3 10*3/UL (ref 0–0.7)
EOSINOPHIL NFR BLD: 5 % (ref 0–4)
ERYTHROCYTE [DISTWIDTH] IN BLOOD BY AUTOMATED COUNT: 14.5 % (ref 11.5–14.5)
GLUCOSE SERPL-MCNC: 101 MG/DL (ref 74–106)
GRANULOCYTES # BLD AUTO: 78.5 % (ref 42.2–75.2)
HCO3 BLDA-SCNC: 21.2 MEQ/L (ref 22–26)
HCT VFR BLD AUTO: 26.4 % (ref 42–52)
HGB BLD-MCNC: 8.3 G/DL (ref 13.5–18)
INHALED O2 CONCENTRATION: 50 %
INR BLD: 1.7 (ref 0.8–3)
LYMPHOCYTES # BLD: 0.6 10*3/UL (ref 1.2–3.4)
LYMPHOCYTES NFR BLD: 10.2 % (ref 20–51)
MAGNESIUM SERPL-MCNC: 2.3 MG/DL (ref 1.6–2.3)
MCH RBC QN AUTO: 31 PG (ref 27–31)
MCHC RBC AUTO-ENTMCNC: 31 G/DL (ref 33–37)
MCV RBC AUTO: 99 FL (ref 80–100)
MONOCYTES # BLD: 0.3 10*3/UL (ref 0.1–0.6)
MONOCYTES NFR BLD AUTO: 5.7 % (ref 1.7–9.3)
NEUTROPHILS # BLD: 4.5 10*3/UL (ref 1.4–6.5)
PCO2 BLDA: 35.4 MMHG (ref 35–45)
PHOSPHATE SERPL-MCNC: 2.2 MG/DL (ref 2.5–4.5)
PLATELET # BLD AUTO: 126 K/MM3 (ref 130–400)
PMV BLD AUTO: 9.8 FL (ref 7.4–10.4)
PO2 BLDA: 87.7 MMHG (ref 80–100)
POTASSIUM SERPL-SCNC: 4.1 MMOL/L (ref 3.4–5)
PROT SERPL-MCNC: 5.5 GM/DL (ref 6.4–8.2)
PROTHROMBIN TIME: 19.1 SECONDS (ref 9.7–12.8)
RBC # BLD AUTO: 2.66 M/MM3 (ref 4.2–5.6)
SAO2 % BLDA: 95.6 % (ref 92–100)
SODIUM SERPL-SCNC: 144 MMOL/L (ref 137–145)

## 2019-02-20 NOTE — NUR
PT ON SMART CARE ALBERTO VERY WELL. ON 10 OF PRESSURE SUPPORT. PT IS AWAKE OFF OF
SEDATION. NO DISTRESS IS NOTED AT THIS TIME WILL CONTINUE TO MONITOR ASSESS
AND LET DAY SHIFT RT KNOW OF PT STATUS AND CURRENT SETTINGS IN [facility]

## 2019-02-20 NOTE — NUR
Patient assessment completed and charted at this time, please see
documentation for details. Patient tolerating ventilator well, no new issues
to report. Will continue to monitor and assess.

## 2019-02-20 NOTE — NUR
SW and SW student attended clinical rounding.  Patient is still intubated at
this time.  SW will continue to follow.  Updates sent to [facility].

## 2019-02-20 NOTE — NUR
Patient assessment completed and charted at this time. No changes from
previously, will continue to monitor and assess.

## 2019-02-20 NOTE — NUR
Pt intubated unable to assess mentation, appears to be resting confortably in
bed no non-verbal s/s of pain noted.

## 2019-02-20 NOTE — NUR
Patienta assessment completed and charted at this time, please see
documentation for details. Patient tolerating ventilator well at this time,
will continue to monitor and assess.

## 2019-02-21 VITALS — OXYGEN SATURATION: 98 %

## 2019-02-21 VITALS — OXYGEN SATURATION: 97 %

## 2019-02-21 VITALS — OXYGEN SATURATION: 94 %

## 2019-02-21 VITALS — OXYGEN SATURATION: 96 %

## 2019-02-21 VITALS — OXYGEN SATURATION: 99 %

## 2019-02-21 VITALS
HEART RATE: 60 BPM | OXYGEN SATURATION: 96 % | TEMPERATURE: 98.1 F | DIASTOLIC BLOOD PRESSURE: 60 MMHG | SYSTOLIC BLOOD PRESSURE: 124 MMHG

## 2019-02-21 VITALS — OXYGEN SATURATION: 92 %

## 2019-02-21 VITALS
OXYGEN SATURATION: 99 % | HEART RATE: 61 BPM | TEMPERATURE: 98.4 F | DIASTOLIC BLOOD PRESSURE: 66 MMHG | SYSTOLIC BLOOD PRESSURE: 123 MMHG

## 2019-02-21 VITALS
HEART RATE: 60 BPM | SYSTOLIC BLOOD PRESSURE: 119 MMHG | TEMPERATURE: 98.1 F | OXYGEN SATURATION: 97 % | DIASTOLIC BLOOD PRESSURE: 59 MMHG

## 2019-02-21 VITALS — DIASTOLIC BLOOD PRESSURE: 65 MMHG | HEART RATE: 60 BPM | OXYGEN SATURATION: 97 % | SYSTOLIC BLOOD PRESSURE: 123 MMHG

## 2019-02-21 VITALS — OXYGEN SATURATION: 100 %

## 2019-02-21 VITALS — DIASTOLIC BLOOD PRESSURE: 72 MMHG | HEART RATE: 69 BPM | OXYGEN SATURATION: 96 % | SYSTOLIC BLOOD PRESSURE: 141 MMHG

## 2019-02-21 VITALS — SYSTOLIC BLOOD PRESSURE: 146 MMHG | DIASTOLIC BLOOD PRESSURE: 87 MMHG | HEART RATE: 62 BPM

## 2019-02-21 VITALS — OXYGEN SATURATION: 95 %

## 2019-02-21 VITALS — DIASTOLIC BLOOD PRESSURE: 66 MMHG | HEART RATE: 60 BPM | SYSTOLIC BLOOD PRESSURE: 127 MMHG | TEMPERATURE: 98.5 F

## 2019-02-21 VITALS — OXYGEN SATURATION: 93 %

## 2019-02-21 VITALS
DIASTOLIC BLOOD PRESSURE: 64 MMHG | TEMPERATURE: 97.8 F | OXYGEN SATURATION: 99 % | HEART RATE: 63 BPM | SYSTOLIC BLOOD PRESSURE: 130 MMHG

## 2019-02-21 LAB
ALBUMIN SERPL-MCNC: 2.6 GM/DL (ref 3.5–5)
ALP SERPL-CCNC: 83 U/L (ref 50–136)
ALT SERPL-CCNC: 29 U/L (ref 21–72)
ANION GAP SERPL CALC-SCNC: 5 MMOL/L (ref 7–16)
AST SERPL-CCNC: 30 U/L (ref 15–37)
BASE EXCESS BLDA CALC-SCNC: -1.2 MMOL/L (ref -2–2)
BILIRUB SERPL-MCNC: 0.5 MG/DL (ref 0–1)
BUN SERPL-MCNC: 36 MG/DL (ref 9–20)
CALCIUM SERPL-MCNC: 8.2 MG/DL (ref 8.4–10.2)
CHLORIDE SERPL-SCNC: 116 MMOL/L (ref 98–107)
CO2 BLDA-SCNC: 24.4 MMOL/L
CO2 SERPL-SCNC: 25 MMOL/L (ref 22–30)
CREAT SERPL-SCNC: 1.32 MG/DL (ref 0.66–1.25)
ERYTHROCYTE [DISTWIDTH] IN BLOOD BY AUTOMATED COUNT: 14.5 % (ref 11.5–14.5)
GLUCOSE SERPL-MCNC: 109 MG/DL (ref 74–106)
HCO3 BLDA-SCNC: 23.3 MEQ/L (ref 22–26)
HCT VFR BLD AUTO: 26.4 % (ref 42–52)
HGB BLD-MCNC: 8.2 G/DL (ref 13.5–18)
INHALED O2 CONCENTRATION: 40 %
INR BLD: 1.4 (ref 0.8–3)
MCH RBC QN AUTO: 31 PG (ref 27–31)
MCHC RBC AUTO-ENTMCNC: 31 G/DL (ref 33–37)
MCV RBC AUTO: 100 FL (ref 80–100)
PCO2 BLDA: 37.9 MMHG (ref 35–45)
PLATELET # BLD AUTO: 141 K/MM3 (ref 130–400)
PMV BLD AUTO: 10.1 FL (ref 7.4–10.4)
PO2 BLDA: 91.1 MMHG (ref 80–100)
POTASSIUM SERPL-SCNC: 4.1 MMOL/L (ref 3.4–5)
PROT SERPL-MCNC: 5.7 GM/DL (ref 6.4–8.2)
PROTHROMBIN TIME: 15.5 SECONDS (ref 9.7–12.8)
RBC # BLD AUTO: 2.65 M/MM3 (ref 4.2–5.6)
SAO2 % BLDA: 95.6 % (ref 92–100)
SODIUM SERPL-SCNC: 145 MMOL/L (ref 137–145)

## 2019-02-21 NOTE — NUR
Dr. Magaña at bedside for KAROL, Time Out Completed, Propfol increased from
15mcg/kg/min to 30mcg/kg/min, per MD request

## 2019-02-21 NOTE — NUR
Bedside procedure completed, Diprivan drip back at original rate, Per Dr. Sosa no vegetation noted. Pt appears comfortable at absent of pain, VSS no
and throughout procedure.
Oral
care completed and pt turned, will continue to monitor.

## 2019-02-21 NOTE — NUR
Patient assessment completed and charted at this time, please see
documentation for details. Patient tolerating ventilator well, tube feeds
stopped at 0000 for procedure in AM.

## 2019-02-21 NOTE — NUR
PT RECEIVED ON SMART CARE WITH A PS OF ZERO.  AT THIS TIME WE WILL NOT
EXTUBATE DUE TO PENDING PROCEDURE.  WILL PLACE BACK ON CMV LATER THIS AM.

## 2019-02-22 VITALS — OXYGEN SATURATION: 100 %

## 2019-02-22 VITALS — OXYGEN SATURATION: 99 %

## 2019-02-22 VITALS — OXYGEN SATURATION: 93 %

## 2019-02-22 VITALS — OXYGEN SATURATION: 98 %

## 2019-02-22 VITALS — OXYGEN SATURATION: 94 %

## 2019-02-22 VITALS — OXYGEN SATURATION: 96 %

## 2019-02-22 VITALS — OXYGEN SATURATION: 97 %

## 2019-02-22 VITALS — TEMPERATURE: 98.2 F | SYSTOLIC BLOOD PRESSURE: 155 MMHG | DIASTOLIC BLOOD PRESSURE: 75 MMHG | HEART RATE: 60 BPM

## 2019-02-22 VITALS — OXYGEN SATURATION: 95 %

## 2019-02-22 VITALS — HEART RATE: 60 BPM | SYSTOLIC BLOOD PRESSURE: 147 MMHG | DIASTOLIC BLOOD PRESSURE: 70 MMHG | OXYGEN SATURATION: 98 %

## 2019-02-22 VITALS — HEART RATE: 60 BPM | SYSTOLIC BLOOD PRESSURE: 148 MMHG | DIASTOLIC BLOOD PRESSURE: 68 MMHG | TEMPERATURE: 97 F

## 2019-02-22 VITALS — HEART RATE: 60 BPM | DIASTOLIC BLOOD PRESSURE: 71 MMHG | SYSTOLIC BLOOD PRESSURE: 133 MMHG | OXYGEN SATURATION: 99 %

## 2019-02-22 VITALS — DIASTOLIC BLOOD PRESSURE: 60 MMHG | HEART RATE: 60 BPM | SYSTOLIC BLOOD PRESSURE: 1118 MMHG

## 2019-02-22 VITALS — TEMPERATURE: 98.5 F | SYSTOLIC BLOOD PRESSURE: 143 MMHG | DIASTOLIC BLOOD PRESSURE: 75 MMHG | HEART RATE: 62 BPM

## 2019-02-22 VITALS — HEART RATE: 62 BPM | TEMPERATURE: 99.1 F | DIASTOLIC BLOOD PRESSURE: 73 MMHG | SYSTOLIC BLOOD PRESSURE: 142 MMHG

## 2019-02-22 LAB
ALBUMIN SERPL-MCNC: 2.8 GM/DL (ref 3.5–5)
ALP SERPL-CCNC: 83 U/L (ref 50–136)
ALT SERPL-CCNC: 30 U/L (ref 21–72)
ANION GAP SERPL CALC-SCNC: 4 MMOL/L (ref 7–16)
AST SERPL-CCNC: 34 U/L (ref 15–37)
BASE EXCESS BLDA CALC-SCNC: 3.6 MMOL/L (ref -2–2)
BASOPHILS # BLD: 0 10*3/UL (ref 0–0.2)
BASOPHILS NFR BLD AUTO: 0.4 % (ref 0–2)
BILIRUB SERPL-MCNC: 0.4 MG/DL (ref 0–1)
BUN SERPL-MCNC: 37 MG/DL (ref 9–20)
CALCIUM SERPL-MCNC: 8.6 MG/DL (ref 8.4–10.2)
CHLORIDE SERPL-SCNC: 113 MMOL/L (ref 98–107)
CO2 BLDA-SCNC: 29.9 MMOL/L
CO2 SERPL-SCNC: 28 MMOL/L (ref 22–30)
CREAT SERPL-SCNC: 1.32 MG/DL (ref 0.66–1.25)
EOSINOPHIL # BLD: 0.3 10*3/UL (ref 0–0.7)
EOSINOPHIL NFR BLD: 5.4 % (ref 0–4)
ERYTHROCYTE [DISTWIDTH] IN BLOOD BY AUTOMATED COUNT: 14.3 % (ref 11.5–14.5)
GLUCOSE SERPL-MCNC: 110 MG/DL (ref 74–106)
GRANULOCYTES # BLD AUTO: 71.7 % (ref 42.2–75.2)
HCO3 BLDA-SCNC: 28.5 MEQ/L (ref 22–26)
HCT VFR BLD AUTO: 28.4 % (ref 42–52)
HGB BLD-MCNC: 9 G/DL (ref 13.5–18)
INHALED O2 CONCENTRATION: 40 %
INR BLD: 1.3 (ref 0.8–3)
LYMPHOCYTES # BLD: 0.9 10*3/UL (ref 1.2–3.4)
LYMPHOCYTES NFR BLD: 15.3 % (ref 20–51)
MCH RBC QN AUTO: 31 PG (ref 27–31)
MCHC RBC AUTO-ENTMCNC: 32 G/DL (ref 33–37)
MCV RBC AUTO: 98 FL (ref 80–100)
MONOCYTES # BLD: 0.4 10*3/UL (ref 0.1–0.6)
MONOCYTES NFR BLD AUTO: 6.7 % (ref 1.7–9.3)
NEUTROPHILS # BLD: 4.1 10*3/UL (ref 1.4–6.5)
PCO2 BLDA: 44.9 MMHG (ref 35–45)
PLATELET # BLD AUTO: 159 K/MM3 (ref 130–400)
PMV BLD AUTO: 9.7 FL (ref 7.4–10.4)
PO2 BLDA: 96 MMHG (ref 80–100)
POTASSIUM SERPL-SCNC: 4.2 MMOL/L (ref 3.4–5)
PROT SERPL-MCNC: 6 GM/DL (ref 6.4–8.2)
PROTHROMBIN TIME: 14.6 SECONDS (ref 9.7–12.8)
RBC # BLD AUTO: 2.89 M/MM3 (ref 4.2–5.6)
SAO2 % BLDA: 96.5 % (ref 92–100)
SODIUM SERPL-SCNC: 144 MMOL/L (ref 137–145)

## 2019-02-22 NOTE — NUR
LELA and LELA student attended clinical rounding.  Patient is not being extubated
today.  LELA faxed clinical update to [facility].  Will continue to follow.

## 2019-02-22 NOTE — NUR
Bedside report recieved from Randall FRANCISCO. All sedation off at this time for smart
care and possible extubation. Patient tolerates well. RT in room

## 2019-02-22 NOTE — NUR
Dr. Michelle decides not to extubate today DT increase of secretions and patient
being unable to clear them, will wait through weekend and reattempt on Monday.
Would like to attempt thoracentesis tomorrow, so orders recieved to hold
Lovenox x2 doses.

## 2019-02-22 NOTE — NUR
PT NOT RESPONDING WHEN STERNAL RUBBED. PROPOFOL TURNED OFF FOR 20 MIN, NO
RESPONSE. FENTYNL THEN TURNED OFF IN ADDITION TO PROPOFOL FOR 10 MIN WHEN PT
BEGAN TO AWAKEN FROM SEDATION ENOUGH TO OPEN EYES AND FOLLOW SOME COMMANDS. IV
GTTS RESUMED.
PT HAS BILAT UPPER ARM AND LOWER LEG EDEMA. LEFT ARM WORSE THAN RIGHT. NO
SIGNS OF WEEPING.
PT UNABLE TO LIFT HANDS OFF BED, BUT DOES SQUEEZE HANDS ON COMMAND. PT NOT
ABLE TO FOLLOW COMMANDS TO MOVE LOWER EXTREMITIES, BUT DOES MOVE BILAT FEET
AND TOES.

## 2019-02-22 NOTE — NUR
Patient assessment completed and charted, tolerating ventilator well at this
time. No new issues to report, will continue to monitor and assess.

## 2019-02-22 NOTE — NUR
Family in room. Spoke with them at length about POC, plan for extubating on
Monday. Daughter very tearful, emotional support given. Denies needs at this
time.

## 2019-02-22 NOTE — NUR
Assessment completed and charted, no new issues to report. Patient on
ventilator, tolerating. Will continue to monitor and assess.

## 2019-02-23 VITALS — OXYGEN SATURATION: 98 %

## 2019-02-23 VITALS — OXYGEN SATURATION: 99 %

## 2019-02-23 VITALS — OXYGEN SATURATION: 97 %

## 2019-02-23 VITALS — OXYGEN SATURATION: 100 %

## 2019-02-23 VITALS — SYSTOLIC BLOOD PRESSURE: 159 MMHG | HEART RATE: 63 BPM | DIASTOLIC BLOOD PRESSURE: 75 MMHG | TEMPERATURE: 99.2 F

## 2019-02-23 VITALS — TEMPERATURE: 97.9 F | HEART RATE: 62 BPM | SYSTOLIC BLOOD PRESSURE: 156 MMHG | DIASTOLIC BLOOD PRESSURE: 82 MMHG

## 2019-02-23 VITALS — OXYGEN SATURATION: 85 %

## 2019-02-23 VITALS — OXYGEN SATURATION: 98 % | DIASTOLIC BLOOD PRESSURE: 69 MMHG | HEART RATE: 60 BPM | SYSTOLIC BLOOD PRESSURE: 142 MMHG

## 2019-02-23 VITALS — TEMPERATURE: 97.7 F | DIASTOLIC BLOOD PRESSURE: 74 MMHG | SYSTOLIC BLOOD PRESSURE: 151 MMHG | HEART RATE: 60 BPM

## 2019-02-23 VITALS — OXYGEN SATURATION: 96 %

## 2019-02-23 VITALS — TEMPERATURE: 97.4 F | HEART RATE: 62 BPM | SYSTOLIC BLOOD PRESSURE: 125 MMHG | DIASTOLIC BLOOD PRESSURE: 63 MMHG

## 2019-02-23 VITALS
TEMPERATURE: 97.9 F | HEART RATE: 62 BPM | SYSTOLIC BLOOD PRESSURE: 160 MMHG | DIASTOLIC BLOOD PRESSURE: 84 MMHG | OXYGEN SATURATION: 98 %

## 2019-02-23 LAB
ALBUMIN SERPL-MCNC: 3.1 GM/DL (ref 3.5–5)
ALP SERPL-CCNC: 88 U/L (ref 50–136)
ALT SERPL-CCNC: 32 U/L (ref 21–72)
ANION GAP SERPL CALC-SCNC: 5 MMOL/L (ref 7–16)
AST SERPL-CCNC: 29 U/L (ref 15–37)
BASE EXCESS BLDA CALC-SCNC: 3.7 MMOL/L (ref -2–2)
BASOPHILS # BLD: 0 10*3/UL (ref 0–0.2)
BASOPHILS NFR BLD AUTO: 0.4 % (ref 0–2)
BILIRUB SERPL-MCNC: 0.4 MG/DL (ref 0–1)
BUN SERPL-MCNC: 43 MG/DL (ref 9–20)
CALCIUM SERPL-MCNC: 8.7 MG/DL (ref 8.4–10.2)
CHLORIDE SERPL-SCNC: 111 MMOL/L (ref 98–107)
CO2 BLDA-SCNC: 28.9 MMOL/L
CO2 SERPL-SCNC: 29 MMOL/L (ref 22–30)
CREAT SERPL-SCNC: 1.22 MG/DL (ref 0.66–1.25)
EOSINOPHIL # BLD: 0.3 10*3/UL (ref 0–0.7)
EOSINOPHIL NFR BLD: 4.3 % (ref 0–4)
ERYTHROCYTE [DISTWIDTH] IN BLOOD BY AUTOMATED COUNT: 13.9 % (ref 11.5–14.5)
GLUCOSE SERPL-MCNC: 118 MG/DL (ref 74–106)
GRANULOCYTES # BLD AUTO: 73.7 % (ref 42.2–75.2)
HCO3 BLDA-SCNC: 27.7 MEQ/L (ref 22–26)
HCT VFR BLD AUTO: 31.7 % (ref 42–52)
HGB BLD-MCNC: 10 G/DL (ref 13.5–18)
INHALED O2 CONCENTRATION: 40 %
INR BLD: 1.2 (ref 0.8–3)
LYMPHOCYTES # BLD: 1.1 10*3/UL (ref 1.2–3.4)
LYMPHOCYTES NFR BLD: 14.4 % (ref 20–51)
MCH RBC QN AUTO: 31 PG (ref 27–31)
MCHC RBC AUTO-ENTMCNC: 32 G/DL (ref 33–37)
MCV RBC AUTO: 97 FL (ref 80–100)
MONOCYTES # BLD: 0.5 10*3/UL (ref 0.1–0.6)
MONOCYTES NFR BLD AUTO: 6.5 % (ref 1.7–9.3)
NEUTROPHILS # BLD: 5.5 10*3/UL (ref 1.4–6.5)
PCO2 BLDA: 39.7 MMHG (ref 35–45)
PLATELET # BLD AUTO: 188 K/MM3 (ref 130–400)
PMV BLD AUTO: 9.8 FL (ref 7.4–10.4)
PO2 BLDA: 99.6 MMHG (ref 80–100)
POTASSIUM SERPL-SCNC: 4.3 MMOL/L (ref 3.4–5)
PROT SERPL-MCNC: 6.5 GM/DL (ref 6.4–8.2)
PROTHROMBIN TIME: 13.2 SECONDS (ref 9.7–12.8)
RBC # BLD AUTO: 3.26 M/MM3 (ref 4.2–5.6)
SAO2 % BLDA: 97 % (ref 92–100)
SODIUM SERPL-SCNC: 146 MMOL/L (ref 137–145)

## 2019-02-23 NOTE — NUR
Assessment complete, patient remains on ventilator, opens eyes to voice, does
not follow any commands at this time, AM care performed, patient repositioned
for comfort. Call light within reach.

## 2019-02-23 NOTE — NUR
PT OPENING EYES SPONTANEOUSLY. FOLLOWING SOME, BUT NOT ALL DIRECTIONS. PT ABLE
TO LIFT BILAT HANDS OFF BED, BUT NOT ON COMMAND. PT NOT MOVING TOES ON
COMMAND, BUT FANNING TOES WHEN FINGER SWIPED UP MIDDLE OF POSTERIOR FOOT.

## 2019-02-23 NOTE — NUR
PT TAKEN OFF SMARTCARE AT THIS TIME. PT'S PRESSURE SUPPORT INCREASED FROM 8 TO
12. PT TOLERATED SMARTCARE FOR 3 HOURS. HOWEVER SEPERATION NOT CONSIDERED.

## 2019-02-23 NOTE — NUR
PT NOT OPENING EYES FULLY, BUT OPENING SLIGHTLY TO VOICE.
PT SQUEEZING HANDS EQUALLY, BUT NOT STRONG. PT NOT FOLLOWING COMMANDS TO LIFT
HANDS OFF BED OR TO MOVE FEET, BUT DOES MOVE TOES WITHOUT COMMAND.

## 2019-02-23 NOTE — NUR
PT HAS EYES OPEN AND IS FOLLOWING COMMANDS TO SQUEEZE HANDS, UNEQUAL GRASPS
AND WEAK. PT IS ABLE TO WIGGLE TOES. PT UNABLE TO LIFT HANDS OR FEET OFF BED.
PT IS OFF PROPOFOL, BUT ON FENTYNL AT START OF SHIFT. PT APPEARS COMFORTABLE.

## 2019-02-23 NOTE — NUR
CHANGED TUBE CASTELAN, BECAME MISPLACED ON PTS FACE AND NEEDED FIXED. TUBE STILL
24 AND THE LIP AND TUBE CASTELAN IN THE MIDDLE.

## 2019-02-24 VITALS — OXYGEN SATURATION: 100 %

## 2019-02-24 VITALS — OXYGEN SATURATION: 99 %

## 2019-02-24 VITALS — OXYGEN SATURATION: 98 %

## 2019-02-24 VITALS — DIASTOLIC BLOOD PRESSURE: 72 MMHG | SYSTOLIC BLOOD PRESSURE: 123 MMHG | HEART RATE: 60 BPM | TEMPERATURE: 97.9 F

## 2019-02-24 VITALS — SYSTOLIC BLOOD PRESSURE: 127 MMHG | TEMPERATURE: 97.8 F | DIASTOLIC BLOOD PRESSURE: 67 MMHG | HEART RATE: 62 BPM

## 2019-02-24 VITALS — SYSTOLIC BLOOD PRESSURE: 143 MMHG | TEMPERATURE: 97.4 F | HEART RATE: 60 BPM | DIASTOLIC BLOOD PRESSURE: 71 MMHG

## 2019-02-24 VITALS — TEMPERATURE: 98 F | HEART RATE: 60 BPM | DIASTOLIC BLOOD PRESSURE: 68 MMHG | SYSTOLIC BLOOD PRESSURE: 141 MMHG

## 2019-02-24 VITALS — SYSTOLIC BLOOD PRESSURE: 183 MMHG | DIASTOLIC BLOOD PRESSURE: 90 MMHG | OXYGEN SATURATION: 99 % | HEART RATE: 67 BPM

## 2019-02-24 VITALS — DIASTOLIC BLOOD PRESSURE: 65 MMHG | TEMPERATURE: 97.9 F | HEART RATE: 60 BPM | SYSTOLIC BLOOD PRESSURE: 129 MMHG

## 2019-02-24 VITALS — SYSTOLIC BLOOD PRESSURE: 150 MMHG | DIASTOLIC BLOOD PRESSURE: 89 MMHG | HEART RATE: 60 BPM | TEMPERATURE: 98.1 F

## 2019-02-24 VITALS — OXYGEN SATURATION: 97 %

## 2019-02-24 VITALS — OXYGEN SATURATION: 95 %

## 2019-02-24 LAB
ALBUMIN SERPL-MCNC: 3.1 GM/DL (ref 3.5–5)
ALP SERPL-CCNC: 88 U/L (ref 50–136)
ALT SERPL-CCNC: 34 U/L (ref 21–72)
ANION GAP SERPL CALC-SCNC: 4 MMOL/L (ref 7–16)
AST SERPL-CCNC: 29 U/L (ref 15–37)
BASE EXCESS BLDA CALC-SCNC: 5.5 MMOL/L (ref -2–2)
BASOPHILS # BLD: 0 10*3/UL (ref 0–0.2)
BASOPHILS NFR BLD AUTO: 0.4 % (ref 0–2)
BILIRUB SERPL-MCNC: 0.4 MG/DL (ref 0–1)
BUN SERPL-MCNC: 48 MG/DL (ref 9–20)
CALCIUM SERPL-MCNC: 8.9 MG/DL (ref 8.4–10.2)
CHLORIDE SERPL-SCNC: 109 MMOL/L (ref 98–107)
CO2 BLDA-SCNC: 31.5 MMOL/L
CO2 SERPL-SCNC: 31 MMOL/L (ref 22–30)
CREAT SERPL-SCNC: 1.22 MG/DL (ref 0.66–1.25)
EOSINOPHIL # BLD: 0.4 10*3/UL (ref 0–0.7)
EOSINOPHIL NFR BLD: 4.7 % (ref 0–4)
ERYTHROCYTE [DISTWIDTH] IN BLOOD BY AUTOMATED COUNT: 13.8 % (ref 11.5–14.5)
GLUCOSE SERPL-MCNC: 123 MG/DL (ref 74–106)
GRANULOCYTES # BLD AUTO: 73.1 % (ref 42.2–75.2)
HCO3 BLDA-SCNC: 30.2 MEQ/L (ref 22–26)
HCT VFR BLD AUTO: 31.4 % (ref 42–52)
HGB BLD-MCNC: 10 G/DL (ref 13.5–18)
INHALED O2 CONCENTRATION: 40 %
INR BLD: 1.2 (ref 0.8–3)
LYMPHOCYTES # BLD: 1.1 10*3/UL (ref 1.2–3.4)
LYMPHOCYTES NFR BLD: 14.5 % (ref 20–51)
MCH RBC QN AUTO: 31 PG (ref 27–31)
MCHC RBC AUTO-ENTMCNC: 32 G/DL (ref 33–37)
MCV RBC AUTO: 97 FL (ref 80–100)
MONOCYTES # BLD: 0.5 10*3/UL (ref 0.1–0.6)
MONOCYTES NFR BLD AUTO: 6.4 % (ref 1.7–9.3)
NEUTROPHILS # BLD: 5.6 10*3/UL (ref 1.4–6.5)
PCO2 BLDA: 44.8 MMHG (ref 35–45)
PLATELET # BLD AUTO: 192 K/MM3 (ref 130–400)
PMV BLD AUTO: 9.4 FL (ref 7.4–10.4)
PO2 BLDA: 145.4 MMHG (ref 80–100)
POTASSIUM SERPL-SCNC: 4.4 MMOL/L (ref 3.4–5)
PROT SERPL-MCNC: 6.5 GM/DL (ref 6.4–8.2)
PROTHROMBIN TIME: 14.1 SECONDS (ref 9.7–12.8)
RBC # BLD AUTO: 3.23 M/MM3 (ref 4.2–5.6)
SAO2 % BLDA: 97.8 % (ref 92–100)
SODIUM SERPL-SCNC: 144 MMOL/L (ref 137–145)

## 2019-02-24 NOTE — NUR
PT CONTINUES TO RESPOND ONLY TO SQUEEZING HANDS AND WIGGLING TOES. PT DOES
OPEN EYES SPONTANEOUSLY AND RESPONDS TO FIRST NAME. PT DOES NOT LIFT
LIMBS OFF BED.

## 2019-02-24 NOTE — NUR
PT ABLE TO OPEN EYES SPONTANEOUSLY AND ON COMMAND, ATTEMPTS TO SQUEEZE HANDS
ON COMMAND, BUT WEAK.
PT ATTEMPTS TO LIFT HANDS & FEET ON COMMAND. PT ABLE TO LIFT
RIGHT HAND OFF BED, NOT LEFT HAND. PT MOVING RIGHT LEG, FLEXING AT HIP AND
KNEE.

## 2019-02-24 NOTE — NUR
PT FOLLOWING VERBAL COMMANDS: PT SQUEEZING HANDS THOUGH UNEQUAL GRASP AND
WEAK. PT ATTEMPTS TO LIFT BILAT HANDS OFF BED BUT DOES MOVE HANDS. PT WIGGLES
TOES ON COMMAND, BUT DOES NOT FLEX OR EXTEND AT THE ANKLE ON COMMAND. PT DOES
FAN TOES AND FLEX AT KNEE AND HIP BILAT AT WILL, NOT ON COMMAND. PT MORE AWAKE
AND ALERT NOW SINCE START OF SHIFT.

## 2019-02-24 NOTE — NUR
Assessment complete, family at bedside, patient moving right leg on bed, but
will not open eyes or follow commands at this time, patient repositioned for
comfort, call light within reach.

## 2019-02-25 VITALS — OXYGEN SATURATION: 100 %

## 2019-02-25 VITALS — OXYGEN SATURATION: 99 %

## 2019-02-25 VITALS — OXYGEN SATURATION: 95 %

## 2019-02-25 VITALS — OXYGEN SATURATION: 97 %

## 2019-02-25 VITALS — OXYGEN SATURATION: 94 %

## 2019-02-25 VITALS — HEART RATE: 65 BPM | TEMPERATURE: 99.6 F | DIASTOLIC BLOOD PRESSURE: 76 MMHG | SYSTOLIC BLOOD PRESSURE: 137 MMHG

## 2019-02-25 VITALS — OXYGEN SATURATION: 96 %

## 2019-02-25 VITALS — SYSTOLIC BLOOD PRESSURE: 185 MMHG | HEART RATE: 67 BPM | TEMPERATURE: 98 F | DIASTOLIC BLOOD PRESSURE: 86 MMHG

## 2019-02-25 VITALS
OXYGEN SATURATION: 100 % | TEMPERATURE: 98.3 F | DIASTOLIC BLOOD PRESSURE: 75 MMHG | SYSTOLIC BLOOD PRESSURE: 148 MMHG | HEART RATE: 60 BPM

## 2019-02-25 VITALS — HEART RATE: 61 BPM | SYSTOLIC BLOOD PRESSURE: 141 MMHG | DIASTOLIC BLOOD PRESSURE: 63 MMHG

## 2019-02-25 VITALS — OXYGEN SATURATION: 98 %

## 2019-02-25 VITALS
HEART RATE: 60 BPM | SYSTOLIC BLOOD PRESSURE: 158 MMHG | OXYGEN SATURATION: 100 % | DIASTOLIC BLOOD PRESSURE: 78 MMHG | TEMPERATURE: 98.1 F

## 2019-02-25 VITALS — OXYGEN SATURATION: 93 %

## 2019-02-25 VITALS
DIASTOLIC BLOOD PRESSURE: 69 MMHG | OXYGEN SATURATION: 99 % | SYSTOLIC BLOOD PRESSURE: 135 MMHG | TEMPERATURE: 98.2 F | HEART RATE: 69 BPM

## 2019-02-25 VITALS — TEMPERATURE: 99 F | HEART RATE: 60 BPM | DIASTOLIC BLOOD PRESSURE: 72 MMHG | SYSTOLIC BLOOD PRESSURE: 159 MMHG

## 2019-02-25 VITALS — OXYGEN SATURATION: 79 %

## 2019-02-25 LAB
ALBUMIN SERPL-MCNC: 3 GM/DL (ref 3.5–5)
ALP SERPL-CCNC: 87 U/L (ref 50–136)
ALT SERPL-CCNC: 35 U/L (ref 21–72)
ANION GAP SERPL CALC-SCNC: 5 MMOL/L (ref 7–16)
AST SERPL-CCNC: 31 U/L (ref 15–37)
BASE EXCESS BLDA CALC-SCNC: 5.2 MMOL/L (ref -2–2)
BASOPHILS # BLD: 0 10*3/UL (ref 0–0.2)
BASOPHILS NFR BLD AUTO: 0.4 % (ref 0–2)
BILIRUB SERPL-MCNC: 0.4 MG/DL (ref 0–1)
BUN SERPL-MCNC: 58 MG/DL (ref 9–20)
CALCIUM SERPL-MCNC: 8.7 MG/DL (ref 8.4–10.2)
CHLORIDE SERPL-SCNC: 109 MMOL/L (ref 98–107)
CO2 BLDA-SCNC: 31.2 MMOL/L
CO2 SERPL-SCNC: 30 MMOL/L (ref 22–30)
CREAT SERPL-SCNC: 1.13 MG/DL (ref 0.66–1.25)
EOSINOPHIL # BLD: 0.4 10*3/UL (ref 0–0.7)
EOSINOPHIL NFR BLD: 4.4 % (ref 0–4)
ERYTHROCYTE [DISTWIDTH] IN BLOOD BY AUTOMATED COUNT: 13.8 % (ref 11.5–14.5)
GLUCOSE SERPL-MCNC: 159 MG/DL (ref 74–106)
GRANULOCYTES # BLD AUTO: 80.6 % (ref 42.2–75.2)
HCO3 BLDA-SCNC: 29.8 MEQ/L (ref 22–26)
HCT VFR BLD AUTO: 31.5 % (ref 42–52)
HGB BLD-MCNC: 9.8 G/DL (ref 13.5–18)
INHALED O2 CONCENTRATION: 40 %
INR BLD: 1.2 (ref 0.8–3)
LYMPHOCYTES # BLD: 0.7 10*3/UL (ref 1.2–3.4)
LYMPHOCYTES NFR BLD: 8.6 % (ref 20–51)
MAGNESIUM SERPL-MCNC: 2.5 MG/DL (ref 1.6–2.3)
MCH RBC QN AUTO: 31 PG (ref 27–31)
MCHC RBC AUTO-ENTMCNC: 31 G/DL (ref 33–37)
MCV RBC AUTO: 99 FL (ref 80–100)
MONOCYTES # BLD: 0.5 10*3/UL (ref 0.1–0.6)
MONOCYTES NFR BLD AUTO: 5.6 % (ref 1.7–9.3)
NEUTROPHILS # BLD: 6.7 10*3/UL (ref 1.4–6.5)
PCO2 BLDA: 44.1 MMHG (ref 35–45)
PHOSPHATE SERPL-MCNC: 2.4 MG/DL (ref 2.5–4.5)
PLATELET # BLD AUTO: 219 K/MM3 (ref 130–400)
PMV BLD AUTO: 10.3 FL (ref 7.4–10.4)
PO2 BLDA: 113.4 MMHG (ref 80–100)
POTASSIUM SERPL-SCNC: 4.3 MMOL/L (ref 3.4–5)
PRE ALBUMIN: 15.8 MG/DL (ref 17.6–36)
PROT SERPL-MCNC: 6.4 GM/DL (ref 6.4–8.2)
PROTHROMBIN TIME: 14.1 SECONDS (ref 9.7–12.8)
RBC # BLD AUTO: 3.18 M/MM3 (ref 4.2–5.6)
SAO2 % BLDA: 97.4 % (ref 92–100)
SODIUM SERPL-SCNC: 144 MMOL/L (ref 137–145)

## 2019-02-25 NOTE — NUR
SW attended clinical rounds. The patient remains intubated. LELA contacted and
faxed updates to [place] at [facility] and will continue to follow.

## 2019-02-25 NOTE — NUR
PT WAS PUT ON SMART CARE 1902 AT 2030 PT WAS DOWN TO A PRESSURE SUPPPORT OF 0
AND READY FOR EXTUBATION. CALLED DR. CONDON TO CONFIRM EXTUBATION. AT 2040
EXTUBATION WAS DONE. PT WAS EXTUBATED TO ROOM AIR ALBERTO WELL WITH SATS AT 97%
WITH A HEART RATE OF 65 AND BREATHING RATE OF 20. PTS BREATH SOUNDS WERE
COURSE AND HE CONTINUES TO COUGH UP SECRETIONS AT THIS TIME. INTERMITTINTLY
USING ORAL CARE SUCTION YAUNKER TO SUCTION AND SECRETIONS THAT MAY BE COUGHED
UP. RN WAS AT BEDSIDE. WILL CONTINUE TO DO Q6 BREATHING TREATMENTS WITH A
MASK. WILL CONTINUE TO MONITOR AND ASSESS PT THROUGHOUT THE NIGHT.

## 2019-02-25 NOTE — NUR
PT EXTUBATED AT 2040, PT TOLERATED PROCEDURE WELL.
O2 SAT AT 96-98% ON ROOM
AIR. DAUGHTERFREDY UPDATED VIA PHONE CALL AT 2050.

## 2019-02-25 NOTE — NUR
PICC intact right upper arm with dressing change done with sterile technique.
Insertion site cleansed with ChloraPrep 1, chlorhexidine impregnated disc
applied, skin prep, StatLock, and Tegaderm applied.  No signs or symptoms of
IV complications noted.  Patient unable to address any concerns due to being
on the ventilator.  Wrapped with Ace to protect catheter.

## 2019-02-25 NOTE — NUR
PT CURRENTLY ON SMART CARE, CPAP MODE AND TOLERATING WELL SO FAR. PS AT 0. PT
AROUSABLE AND WAKES UP TO VOICE. WILL CONTINUE TO MONITOR.

## 2019-02-25 NOTE — NUR
VENTILATOR CHARTINGS, WEANING ASSESSMENT, ETTUBE MANAGMENTS AND ANY OTHER
VENTILATOR INTERVENTIONS HAVE BEEN COMPLETED OUT DUE TO BEING EXTUBATED AT
2040

## 2019-02-25 NOTE — NUR
PT NOT OPENING EYES, BUT FOLLOWS VERBAL COMMAND TO SQUEEZE HANDS. GRASP IS
WEAK AND UNEQUAL. PT DOES MOVE BILAT FEET/TOES WHEN BOTTOMS OF FEET STIMULATED
WITH FINGER. PT LIFTING RIGHT HAND OFF BED, NOT LEFT HAND ON VERBAL COMMAND.

## 2019-02-25 NOTE — NUR
REPORT RECEIVED FROMMILLI KELLER RN. PATIENT CONTINUES TO BE INTUBATED. SEDATION
HAS BEEN OFF SINCE 0400. HE REMAINS SEDATED. WHEN I SPEAK LOUDLY IN HIS EAR,
HIS EYES FLUTTER, BUT REMAIN CLOSED. WHEN I STIMULATE HIM BY TICKLING HIS
FEET. HE WITHDRAWS SLIGHTLY. PATIENT DOES NOT FOLLOW COMMANDS AT THIS TIME.
VENTILATOR SETTING IS ASSIST CONTROL MODE. CARE ASSUMED AT THIS TIME.

## 2019-02-26 VITALS — OXYGEN SATURATION: 96 %

## 2019-02-26 VITALS — OXYGEN SATURATION: 93 %

## 2019-02-26 VITALS — OXYGEN SATURATION: 97 %

## 2019-02-26 VITALS — OXYGEN SATURATION: 94 %

## 2019-02-26 VITALS — OXYGEN SATURATION: 95 %

## 2019-02-26 VITALS — OXYGEN SATURATION: 99 %

## 2019-02-26 VITALS — OXYGEN SATURATION: 100 %

## 2019-02-26 VITALS — OXYGEN SATURATION: 98 %

## 2019-02-26 VITALS — SYSTOLIC BLOOD PRESSURE: 174 MMHG | DIASTOLIC BLOOD PRESSURE: 84 MMHG | HEART RATE: 60 BPM

## 2019-02-26 VITALS — DIASTOLIC BLOOD PRESSURE: 65 MMHG | SYSTOLIC BLOOD PRESSURE: 135 MMHG

## 2019-02-26 VITALS — OXYGEN SATURATION: 91 %

## 2019-02-26 VITALS — HEART RATE: 60 BPM | SYSTOLIC BLOOD PRESSURE: 147 MMHG | DIASTOLIC BLOOD PRESSURE: 75 MMHG

## 2019-02-26 VITALS — TEMPERATURE: 98.1 F | SYSTOLIC BLOOD PRESSURE: 168 MMHG | HEART RATE: 61 BPM | DIASTOLIC BLOOD PRESSURE: 87 MMHG

## 2019-02-26 VITALS — TEMPERATURE: 97.3 F | DIASTOLIC BLOOD PRESSURE: 78 MMHG | HEART RATE: 95 BPM | SYSTOLIC BLOOD PRESSURE: 161 MMHG

## 2019-02-26 VITALS — DIASTOLIC BLOOD PRESSURE: 57 MMHG | SYSTOLIC BLOOD PRESSURE: 152 MMHG | TEMPERATURE: 98.8 F | HEART RATE: 62 BPM

## 2019-02-26 VITALS — SYSTOLIC BLOOD PRESSURE: 144 MMHG | DIASTOLIC BLOOD PRESSURE: 78 MMHG

## 2019-02-26 VITALS — OXYGEN SATURATION: 92 %

## 2019-02-26 VITALS — HEART RATE: 60 BPM | SYSTOLIC BLOOD PRESSURE: 142 MMHG | TEMPERATURE: 98.4 F | DIASTOLIC BLOOD PRESSURE: 88 MMHG

## 2019-02-26 VITALS — DIASTOLIC BLOOD PRESSURE: 63 MMHG | TEMPERATURE: 97.5 F | SYSTOLIC BLOOD PRESSURE: 141 MMHG | HEART RATE: 67 BPM

## 2019-02-26 VITALS — OXYGEN SATURATION: 90 %

## 2019-02-26 VITALS — TEMPERATURE: 97.6 F | HEART RATE: 60 BPM | SYSTOLIC BLOOD PRESSURE: 151 MMHG | DIASTOLIC BLOOD PRESSURE: 72 MMHG

## 2019-02-26 LAB
ANION GAP SERPL CALC-SCNC: 5 MMOL/L (ref 7–16)
BASOPHILS # BLD: 0.1 10*3/UL (ref 0–0.2)
BASOPHILS NFR BLD AUTO: 0.6 % (ref 0–2)
BUN SERPL-MCNC: 53 MG/DL (ref 9–20)
CALCIUM SERPL-MCNC: 9 MG/DL (ref 8.4–10.2)
CHLORIDE SERPL-SCNC: 112 MMOL/L (ref 98–107)
CO2 SERPL-SCNC: 28 MMOL/L (ref 22–30)
CREAT SERPL-SCNC: 1.19 MG/DL (ref 0.66–1.25)
EOSINOPHIL # BLD: 0.1 10*3/UL (ref 0–0.7)
EOSINOPHIL NFR BLD: 1.5 % (ref 0–4)
ERYTHROCYTE [DISTWIDTH] IN BLOOD BY AUTOMATED COUNT: 14 % (ref 11.5–14.5)
GLUCOSE SERPL-MCNC: 102 MG/DL (ref 74–106)
GRANULOCYTES # BLD AUTO: 80.6 % (ref 42.2–75.2)
HCT VFR BLD AUTO: 30.8 % (ref 42–52)
HGB BLD-MCNC: 9.8 G/DL (ref 13.5–18)
LYMPHOCYTES # BLD: 1.1 10*3/UL (ref 1.2–3.4)
LYMPHOCYTES NFR BLD: 11.4 % (ref 20–51)
MCH RBC QN AUTO: 31 PG (ref 27–31)
MCHC RBC AUTO-ENTMCNC: 32 G/DL (ref 33–37)
MCV RBC AUTO: 97 FL (ref 80–100)
MONOCYTES # BLD: 0.5 10*3/UL (ref 0.1–0.6)
MONOCYTES NFR BLD AUTO: 5.3 % (ref 1.7–9.3)
NEUTROPHILS # BLD: 7.8 10*3/UL (ref 1.4–6.5)
PLATELET # BLD AUTO: 213 K/MM3 (ref 130–400)
PMV BLD AUTO: 10 FL (ref 7.4–10.4)
POTASSIUM SERPL-SCNC: 3.9 MMOL/L (ref 3.4–5)
RBC # BLD AUTO: 3.19 M/MM3 (ref 4.2–5.6)
SODIUM SERPL-SCNC: 145 MMOL/L (ref 137–145)

## 2019-02-26 NOTE — NUR
LELA attended clinical rounds. The patient has been extubated and plan is for
the patient to transfer up to the floor today, 2/26. LELA then followed up with
the patient and patient's daughter, Toya, to review discharge plan. Toya
reports that the plan is for the patient to return back to [facility]
for SNF. LELA then presented and explained the patient choice form to the
patient's daughter. The patient's daughter signed and she was provided a copy.
LEAL contacted and faxed updates to Charlotte at [facility]. LELA to continue
to follow.

## 2019-02-26 NOTE — NUR
Pt resting in bed, very hard to understand, oral care provided, shift
assessments complete, left Pt call light in reach, bed in lowest position.

## 2019-02-26 NOTE — NUR
Bedside report received from MARYAM Stevens. Patient currently awake, lying in bed
on room air with no complaints. VS WNL. Patient repositioned at this time.
Patient tries to speak, but voice is garbled. He is coughing up secretions and
continues to cough. Patient is suctioned at this time. Will continue to
monitor.

## 2019-02-26 NOTE — NUR
Patient arrived to room with daughter at bedside. Patient is alert, unable to
speak clearly, gobbled speach. Patient is extremely hard of hearing. On room
air, heart rate and rhythm are regular. Redness and scattered abrasions BLE.
Mild edema BUE. Patient is very weak and needs regular turning with
assistance. Coughing with thick secretions, mouth swabbed for dryness and
suctioned.

## 2019-02-27 VITALS — DIASTOLIC BLOOD PRESSURE: 63 MMHG | HEART RATE: 61 BPM | TEMPERATURE: 97.5 F | SYSTOLIC BLOOD PRESSURE: 156 MMHG

## 2019-02-27 VITALS — DIASTOLIC BLOOD PRESSURE: 72 MMHG | HEART RATE: 60 BPM | SYSTOLIC BLOOD PRESSURE: 171 MMHG | TEMPERATURE: 98.7 F

## 2019-02-27 VITALS — TEMPERATURE: 97.6 F | DIASTOLIC BLOOD PRESSURE: 69 MMHG | HEART RATE: 60 BPM | SYSTOLIC BLOOD PRESSURE: 144 MMHG

## 2019-02-27 VITALS — HEART RATE: 60 BPM | SYSTOLIC BLOOD PRESSURE: 151 MMHG | DIASTOLIC BLOOD PRESSURE: 86 MMHG | TEMPERATURE: 97.4 F

## 2019-02-27 VITALS — HEART RATE: 62 BPM | TEMPERATURE: 96.5 F | DIASTOLIC BLOOD PRESSURE: 76 MMHG | SYSTOLIC BLOOD PRESSURE: 152 MMHG

## 2019-02-27 VITALS — TEMPERATURE: 97.7 F | HEART RATE: 66 BPM | SYSTOLIC BLOOD PRESSURE: 173 MMHG | DIASTOLIC BLOOD PRESSURE: 61 MMHG

## 2019-02-27 LAB
ANION GAP SERPL CALC-SCNC: 7 MMOL/L (ref 7–16)
BASOPHILS # BLD: 0.1 10*3/UL (ref 0–0.2)
BASOPHILS NFR BLD AUTO: 0.9 % (ref 0–2)
BUN SERPL-MCNC: 45 MG/DL (ref 9–20)
CALCIUM SERPL-MCNC: 9.2 MG/DL (ref 8.4–10.2)
CHLORIDE SERPL-SCNC: 116 MMOL/L (ref 98–107)
CO2 SERPL-SCNC: 27 MMOL/L (ref 22–30)
CREAT SERPL-SCNC: 1.17 MG/DL (ref 0.66–1.25)
EOSINOPHIL # BLD: 0.3 10*3/UL (ref 0–0.7)
EOSINOPHIL NFR BLD: 4.1 % (ref 0–4)
ERYTHROCYTE [DISTWIDTH] IN BLOOD BY AUTOMATED COUNT: 14.3 % (ref 11.5–14.5)
GLUCOSE SERPL-MCNC: 91 MG/DL (ref 74–106)
GRANULOCYTES # BLD AUTO: 75.6 % (ref 42.2–75.2)
HCT VFR BLD AUTO: 30 % (ref 42–52)
HGB BLD-MCNC: 9.4 G/DL (ref 13.5–18)
INR BLD: 1.2 (ref 0.8–3)
LYMPHOCYTES # BLD: 0.9 10*3/UL (ref 1.2–3.4)
LYMPHOCYTES NFR BLD: 13.5 % (ref 20–51)
MCH RBC QN AUTO: 30 PG (ref 27–31)
MCHC RBC AUTO-ENTMCNC: 31 G/DL (ref 33–37)
MCV RBC AUTO: 97 FL (ref 80–100)
MONOCYTES # BLD: 0.4 10*3/UL (ref 0.1–0.6)
MONOCYTES NFR BLD AUTO: 5.6 % (ref 1.7–9.3)
NEUTROPHILS # BLD: 5.1 10*3/UL (ref 1.4–6.5)
PLATELET # BLD AUTO: 243 K/MM3 (ref 130–400)
PMV BLD AUTO: 10.2 FL (ref 7.4–10.4)
POTASSIUM SERPL-SCNC: 3.9 MMOL/L (ref 3.4–5)
PROTHROMBIN TIME: 13.4 SECONDS (ref 9.7–12.8)
RBC # BLD AUTO: 3.09 M/MM3 (ref 4.2–5.6)
SODIUM SERPL-SCNC: 149 MMOL/L (ref 137–145)

## 2019-02-27 NOTE — NUR
Pt was very drowsy through the shift, occasionally aroused to voice and for
suctioning. Through most of the day he had gargled speech. Currently alert and
conversing, asking for coffee and a donut. Pt does not appear to be in any
pain. Remained on RA. Samantha DD. Frequent repositioning offered. Family is at
bedside. Call light within reach.

## 2019-02-27 NOTE — NUR
Pt assessment complete. Pt is laying in bed upon entry, he is alert but unable
to assess orientation as patient's speech is very gargled. Pt does not appear
to be in any pain or distress. Suctioning provided. Breathing unlabored on RAKasie Singh DD. Pt repositioned. Daughter at bedside asking about thoracentesis.
Call light within reach.

## 2019-02-27 NOTE — NUR
Met with daughter at bedside by Dr Michelle's request to talk about palliative
care choices.  She is aware that her father is not improving a lot and may
never improve.  he has told her he would not treat his cancer--if that is what
it is.  He has told her he is ready to go be with his wife and while he had
reported that he would be on the ventilator for a short time to get better--he
would not want to do that again.  He is very prone to aspiration even with his
saliva but "wants a drink of water".  Dr Michelle has set up a family meeting at
0830 tomorrow morning.  One son may be there by phone call, and advised
daughter that other family members are welcome if they desire.  Shanice was
provided for pt comfort.  Will continue to follow and support this family
through the hospital stay.

## 2019-02-27 NOTE — NUR
[facility] is holding a room for patient.  He can return there with hospice if no NG
tube or suctioning needed.  Family meeting in the AM to discuss discharge
plan.

## 2019-02-27 NOTE — NUR
LELA amd SW student attended clinical rounding.   has scheduled a family
meeting for tomorrow at 8:30 to discuss how agressive they would like to be.
LELA will continue to follow and updated [facility].

## 2019-02-27 NOTE — NUR
Resting in bed with son at bedside. Repositioned to left side at this time.
Assessment complete. All lung fields diminished. Heart sounds normal. Bowels
active x4. Left arm edema +3, right arm edema +1. PICC to right upper infusing
1/2 NS at 75ml/hr. No pain present at this time. Singh draining to
dependent drainage, yellow urine with sediment. Son denies needs. Call light
in reach. Will monitor.

## 2019-02-28 VITALS — SYSTOLIC BLOOD PRESSURE: 174 MMHG | DIASTOLIC BLOOD PRESSURE: 64 MMHG

## 2019-02-28 VITALS — SYSTOLIC BLOOD PRESSURE: 126 MMHG | DIASTOLIC BLOOD PRESSURE: 51 MMHG

## 2019-02-28 NOTE — NUR
Patient restless and attempting to get out of bed. States "I am going to walk
home if you don't help me." Family request something to help with
restlessness. Spoke with Bina CRUZ added order for rectal tylenol. Blood
pressure also elevated. Gave scheduled lopressor, will check blood pressure
and given PRN hydralazine if needed. Family denies other needs. Son in law
states "I went through a rough time at the end with my dad and this is just
hard to see." Provided support to son in law. Denies other needs at this time.
Call light in reach. Will monitor.

## 2019-02-28 NOTE — NUR
LELA attended a family meeting with patients family, drs, and pallative care
nurse.  Drs explained pallative care vs agressive care and what each would
look like for the patient.  Family all agreed they would like to proceed with
going to the hospice house after looking at the options of [facility] and [facility].  LELA faxed information and set up an appointment for the family to
meet with Adrian at [facility].  Adrian reports they are able to accept with
transport at 3:30p.  Family, nurse, and dr informed of plan and all agreeable.
 EMS set up and DC orders faxed.
 
LELA met with patient and his granddaughter to present IM.  Granddaughter is
agreeable and signed the form.  Copy provided and original placed on chart.
 
Patient dc to [facility].  [facility] informed of plan.
